# Patient Record
Sex: FEMALE | Race: WHITE | NOT HISPANIC OR LATINO
[De-identification: names, ages, dates, MRNs, and addresses within clinical notes are randomized per-mention and may not be internally consistent; named-entity substitution may affect disease eponyms.]

---

## 2017-04-20 ENCOUNTER — ASOB RESULT (OUTPATIENT)
Age: 32
End: 2017-04-20

## 2017-04-20 ENCOUNTER — APPOINTMENT (OUTPATIENT)
Dept: ANTEPARTUM | Facility: CLINIC | Age: 32
End: 2017-04-20

## 2017-05-10 ENCOUNTER — APPOINTMENT (OUTPATIENT)
Dept: ANTEPARTUM | Facility: CLINIC | Age: 32
End: 2017-05-10

## 2017-05-10 ENCOUNTER — ASOB RESULT (OUTPATIENT)
Age: 32
End: 2017-05-10

## 2017-09-23 ENCOUNTER — OUTPATIENT (OUTPATIENT)
Dept: OUTPATIENT SERVICES | Facility: HOSPITAL | Age: 32
LOS: 1 days | End: 2017-09-23
Payer: MEDICAID

## 2017-09-23 DIAGNOSIS — Z3A.00 WEEKS OF GESTATION OF PREGNANCY NOT SPECIFIED: ICD-10-CM

## 2017-09-23 DIAGNOSIS — O26.899 OTHER SPECIFIED PREGNANCY RELATED CONDITIONS, UNSPECIFIED TRIMESTER: ICD-10-CM

## 2017-09-23 PROCEDURE — 59025 FETAL NON-STRESS TEST: CPT

## 2017-09-23 PROCEDURE — G0463: CPT

## 2017-09-23 PROCEDURE — 59025 FETAL NON-STRESS TEST: CPT | Mod: 26

## 2017-10-02 ENCOUNTER — OUTPATIENT (OUTPATIENT)
Dept: OUTPATIENT SERVICES | Facility: HOSPITAL | Age: 32
LOS: 1 days | End: 2017-10-02
Payer: MEDICAID

## 2017-10-02 ENCOUNTER — APPOINTMENT (OUTPATIENT)
Dept: ANTEPARTUM | Facility: CLINIC | Age: 32
End: 2017-10-02
Payer: MEDICAID

## 2017-10-02 ENCOUNTER — ASOB RESULT (OUTPATIENT)
Age: 32
End: 2017-10-02

## 2017-10-02 DIAGNOSIS — Z01.818 ENCOUNTER FOR OTHER PREPROCEDURAL EXAMINATION: ICD-10-CM

## 2017-10-02 PROCEDURE — 76816 OB US FOLLOW-UP PER FETUS: CPT

## 2017-10-02 PROCEDURE — 76818 FETAL BIOPHYS PROFILE W/NST: CPT

## 2017-10-02 PROCEDURE — 76818 FETAL BIOPHYS PROFILE W/NST: CPT | Mod: 26

## 2017-10-02 PROCEDURE — 76816 OB US FOLLOW-UP PER FETUS: CPT | Mod: 26

## 2017-10-03 DIAGNOSIS — O35.8XX0 MATERNAL CARE FOR OTHER (SUSPECTED) FETAL ABNORMALITY AND DAMAGE, NOT APPLICABLE OR UNSPECIFIED: ICD-10-CM

## 2017-10-03 DIAGNOSIS — Z03.74 ENCOUNTER FOR SUSPECTED PROBLEM WITH FETAL GROWTH RULED OUT: ICD-10-CM

## 2017-10-03 DIAGNOSIS — O48.0 POST-TERM PREGNANCY: ICD-10-CM

## 2017-10-04 ENCOUNTER — APPOINTMENT (OUTPATIENT)
Dept: ANTEPARTUM | Facility: CLINIC | Age: 32
End: 2017-10-04
Payer: MEDICAID

## 2017-10-04 ENCOUNTER — ASOB RESULT (OUTPATIENT)
Age: 32
End: 2017-10-04

## 2017-10-04 ENCOUNTER — OUTPATIENT (OUTPATIENT)
Dept: OUTPATIENT SERVICES | Facility: HOSPITAL | Age: 32
LOS: 1 days | End: 2017-10-04
Payer: MEDICAID

## 2017-10-04 DIAGNOSIS — Z01.818 ENCOUNTER FOR OTHER PREPROCEDURAL EXAMINATION: ICD-10-CM

## 2017-10-04 PROCEDURE — 76818 FETAL BIOPHYS PROFILE W/NST: CPT | Mod: 26

## 2017-10-04 PROCEDURE — 76818 FETAL BIOPHYS PROFILE W/NST: CPT

## 2017-10-05 DIAGNOSIS — O48.0 POST-TERM PREGNANCY: ICD-10-CM

## 2017-10-09 ENCOUNTER — INPATIENT (INPATIENT)
Facility: HOSPITAL | Age: 32
LOS: 2 days | Discharge: HOME | End: 2017-10-12
Attending: OBSTETRICS & GYNECOLOGY | Admitting: OBSTETRICS & GYNECOLOGY
Payer: MEDICAID

## 2017-10-09 ENCOUNTER — APPOINTMENT (OUTPATIENT)
Dept: ANTEPARTUM | Facility: CLINIC | Age: 32
End: 2017-10-09

## 2017-10-09 ENCOUNTER — TRANSCRIPTION ENCOUNTER (OUTPATIENT)
Age: 32
End: 2017-10-09

## 2017-10-09 VITALS — WEIGHT: 143.3 LBS | HEIGHT: 62 IN

## 2017-10-09 DIAGNOSIS — O26.899 OTHER SPECIFIED PREGNANCY RELATED CONDITIONS, UNSPECIFIED TRIMESTER: ICD-10-CM

## 2017-10-09 DIAGNOSIS — Z3A.00 WEEKS OF GESTATION OF PREGNANCY NOT SPECIFIED: ICD-10-CM

## 2017-10-09 DIAGNOSIS — Z34.80 ENCOUNTER FOR SUPERVISION OF OTHER NORMAL PREGNANCY, UNSPECIFIED TRIMESTER: ICD-10-CM

## 2017-10-09 LAB
BASOPHILS # BLD AUTO: 0 K/UL — SIGNIFICANT CHANGE UP (ref 0–0.2)
BASOPHILS NFR BLD AUTO: 0 % — SIGNIFICANT CHANGE UP (ref 0–2)
BLD GP AB SCN SERPL QL: NEGATIVE — SIGNIFICANT CHANGE UP
EOSINOPHIL # BLD AUTO: 0.1 K/UL — SIGNIFICANT CHANGE UP (ref 0–0.5)
EOSINOPHIL NFR BLD AUTO: 0.8 % — SIGNIFICANT CHANGE UP (ref 0–6)
HCT VFR BLD CALC: 34.6 % — SIGNIFICANT CHANGE UP (ref 34.5–45)
HGB BLD-MCNC: 12.4 G/DL — SIGNIFICANT CHANGE UP (ref 11.5–15.5)
LYMPHOCYTES # BLD AUTO: 1.9 K/UL — SIGNIFICANT CHANGE UP (ref 1–3.3)
LYMPHOCYTES # BLD AUTO: 18.9 % — SIGNIFICANT CHANGE UP (ref 13–44)
MCHC RBC-ENTMCNC: 33.7 PG — SIGNIFICANT CHANGE UP (ref 27–34)
MCHC RBC-ENTMCNC: 35.9 GM/DL — SIGNIFICANT CHANGE UP (ref 32–36)
MCV RBC AUTO: 93.8 FL — SIGNIFICANT CHANGE UP (ref 80–100)
MONOCYTES # BLD AUTO: 0.6 K/UL — SIGNIFICANT CHANGE UP (ref 0–0.9)
MONOCYTES NFR BLD AUTO: 5.6 % — SIGNIFICANT CHANGE UP (ref 2–14)
NEUTROPHILS # BLD AUTO: 7.4 K/UL — SIGNIFICANT CHANGE UP (ref 1.8–7.4)
NEUTROPHILS NFR BLD AUTO: 74.7 % — SIGNIFICANT CHANGE UP (ref 43–77)
PLATELET # BLD AUTO: 201 K/UL — SIGNIFICANT CHANGE UP (ref 150–400)
RBC # BLD: 3.69 M/UL — LOW (ref 3.8–5.2)
RBC # FLD: 12.1 % — SIGNIFICANT CHANGE UP (ref 10.3–14.5)
RH IG SCN BLD-IMP: POSITIVE — SIGNIFICANT CHANGE UP
T PALLIDUM AB TITR SER: NEGATIVE — SIGNIFICANT CHANGE UP
WBC # BLD: 10 K/UL — SIGNIFICANT CHANGE UP (ref 3.8–10.5)
WBC # FLD AUTO: 10 K/UL — SIGNIFICANT CHANGE UP (ref 3.8–10.5)

## 2017-10-09 RX ORDER — KETOROLAC TROMETHAMINE 30 MG/ML
30 SYRINGE (ML) INJECTION EVERY 6 HOURS
Qty: 0 | Refills: 0 | Status: DISCONTINUED | OUTPATIENT
Start: 2017-10-10 | End: 2017-10-12

## 2017-10-09 RX ORDER — ONDANSETRON 8 MG/1
4 TABLET, FILM COATED ORAL EVERY 6 HOURS
Qty: 0 | Refills: 0 | Status: DISCONTINUED | OUTPATIENT
Start: 2017-10-09 | End: 2017-10-11

## 2017-10-09 RX ORDER — HEPARIN SODIUM 5000 [USP'U]/ML
5000 INJECTION INTRAVENOUS; SUBCUTANEOUS EVERY 12 HOURS
Qty: 0 | Refills: 0 | Status: DISCONTINUED | OUTPATIENT
Start: 2017-10-10 | End: 2017-10-12

## 2017-10-09 RX ORDER — CITRIC ACID/SODIUM CITRATE 300-500 MG
15 SOLUTION, ORAL ORAL EVERY 4 HOURS
Qty: 0 | Refills: 0 | Status: DISCONTINUED | OUTPATIENT
Start: 2017-10-09 | End: 2017-10-09

## 2017-10-09 RX ORDER — SODIUM CHLORIDE 9 MG/ML
1000 INJECTION, SOLUTION INTRAVENOUS
Qty: 0 | Refills: 0 | Status: DISCONTINUED | OUTPATIENT
Start: 2017-10-09 | End: 2017-10-10

## 2017-10-09 RX ORDER — OXYTOCIN 10 UNIT/ML
333.33 VIAL (ML) INJECTION
Qty: 20 | Refills: 0 | Status: COMPLETED | OUTPATIENT
Start: 2017-10-09 | End: 2017-10-09

## 2017-10-09 RX ORDER — HYDROMORPHONE HYDROCHLORIDE 2 MG/ML
0.5 INJECTION INTRAMUSCULAR; INTRAVENOUS; SUBCUTANEOUS
Qty: 0 | Refills: 0 | Status: DISCONTINUED | OUTPATIENT
Start: 2017-10-09 | End: 2017-10-11

## 2017-10-09 RX ORDER — OXYCODONE HYDROCHLORIDE 5 MG/1
5 TABLET ORAL
Qty: 0 | Refills: 0 | Status: COMPLETED | OUTPATIENT
Start: 2017-10-10 | End: 2017-10-17

## 2017-10-09 RX ORDER — SODIUM CHLORIDE 9 MG/ML
500 INJECTION, SOLUTION INTRAVENOUS ONCE
Qty: 0 | Refills: 0 | Status: DISCONTINUED | OUTPATIENT
Start: 2017-10-09 | End: 2017-10-09

## 2017-10-09 RX ORDER — SODIUM CHLORIDE 9 MG/ML
1000 INJECTION, SOLUTION INTRAVENOUS
Qty: 0 | Refills: 0 | Status: DISCONTINUED | OUTPATIENT
Start: 2017-10-10 | End: 2017-10-12

## 2017-10-09 RX ORDER — OXYTOCIN 10 UNIT/ML
41.67 VIAL (ML) INJECTION
Qty: 20 | Refills: 0 | Status: DISCONTINUED | OUTPATIENT
Start: 2017-10-10 | End: 2017-10-12

## 2017-10-09 RX ORDER — LANOLIN
1 OINTMENT (GRAM) TOPICAL
Qty: 0 | Refills: 0 | Status: DISCONTINUED | OUTPATIENT
Start: 2017-10-10 | End: 2017-10-12

## 2017-10-09 RX ORDER — OXYTOCIN 10 UNIT/ML
333.33 VIAL (ML) INJECTION
Qty: 20 | Refills: 0 | Status: DISCONTINUED | OUTPATIENT
Start: 2017-10-09 | End: 2017-10-12

## 2017-10-09 RX ORDER — OXYTOCIN 10 UNIT/ML
41.67 VIAL (ML) INJECTION
Qty: 20 | Refills: 0 | Status: DISCONTINUED | OUTPATIENT
Start: 2017-10-09 | End: 2017-10-12

## 2017-10-09 RX ORDER — IBUPROFEN 200 MG
600 TABLET ORAL EVERY 6 HOURS
Qty: 0 | Refills: 0 | Status: COMPLETED | OUTPATIENT
Start: 2017-10-10 | End: 2018-09-08

## 2017-10-09 RX ORDER — TETANUS TOXOID, REDUCED DIPHTHERIA TOXOID AND ACELLULAR PERTUSSIS VACCINE, ADSORBED 5; 2.5; 8; 8; 2.5 [IU]/.5ML; [IU]/.5ML; UG/.5ML; UG/.5ML; UG/.5ML
0.5 SUSPENSION INTRAMUSCULAR ONCE
Qty: 0 | Refills: 0 | Status: DISCONTINUED | OUTPATIENT
Start: 2017-10-10 | End: 2017-10-12

## 2017-10-09 RX ORDER — GLYCERIN ADULT
1 SUPPOSITORY, RECTAL RECTAL AT BEDTIME
Qty: 0 | Refills: 0 | Status: DISCONTINUED | OUTPATIENT
Start: 2017-10-10 | End: 2017-10-12

## 2017-10-09 RX ORDER — NALOXONE HYDROCHLORIDE 4 MG/.1ML
0.1 SPRAY NASAL
Qty: 0 | Refills: 0 | Status: DISCONTINUED | OUTPATIENT
Start: 2017-10-09 | End: 2017-10-11

## 2017-10-09 RX ORDER — OXYCODONE HYDROCHLORIDE 5 MG/1
5 TABLET ORAL EVERY 4 HOURS
Qty: 0 | Refills: 0 | Status: DISCONTINUED | OUTPATIENT
Start: 2017-10-10 | End: 2017-10-12

## 2017-10-09 RX ORDER — CEFAZOLIN SODIUM 1 G
VIAL (EA) INJECTION
Qty: 0 | Refills: 0 | Status: DISCONTINUED | OUTPATIENT
Start: 2017-10-10 | End: 2017-10-11

## 2017-10-09 RX ORDER — FERROUS SULFATE 325(65) MG
325 TABLET ORAL DAILY
Qty: 0 | Refills: 0 | Status: DISCONTINUED | OUTPATIENT
Start: 2017-10-10 | End: 2017-10-12

## 2017-10-09 RX ORDER — HYDROMORPHONE HYDROCHLORIDE 2 MG/ML
30 INJECTION INTRAMUSCULAR; INTRAVENOUS; SUBCUTANEOUS
Qty: 0 | Refills: 0 | Status: DISCONTINUED | OUTPATIENT
Start: 2017-10-09 | End: 2017-10-11

## 2017-10-09 RX ORDER — OXYTOCIN 10 UNIT/ML
4 VIAL (ML) INJECTION
Qty: 30 | Refills: 0 | Status: DISCONTINUED | OUTPATIENT
Start: 2017-10-09 | End: 2017-10-12

## 2017-10-09 RX ORDER — SODIUM CHLORIDE 9 MG/ML
1000 INJECTION, SOLUTION INTRAVENOUS
Qty: 0 | Refills: 0 | Status: DISCONTINUED | OUTPATIENT
Start: 2017-10-09 | End: 2017-10-09

## 2017-10-09 RX ORDER — DIPHENHYDRAMINE HCL 50 MG
25 CAPSULE ORAL EVERY 6 HOURS
Qty: 0 | Refills: 0 | Status: DISCONTINUED | OUTPATIENT
Start: 2017-10-10 | End: 2017-10-12

## 2017-10-09 RX ORDER — DOCUSATE SODIUM 100 MG
100 CAPSULE ORAL
Qty: 0 | Refills: 0 | Status: DISCONTINUED | OUTPATIENT
Start: 2017-10-10 | End: 2017-10-12

## 2017-10-09 RX ORDER — OXYTOCIN 10 UNIT/ML
333.33 VIAL (ML) INJECTION
Qty: 20 | Refills: 0 | Status: COMPLETED | OUTPATIENT
Start: 2017-10-09

## 2017-10-09 RX ORDER — SIMETHICONE 80 MG/1
80 TABLET, CHEWABLE ORAL EVERY 4 HOURS
Qty: 0 | Refills: 0 | Status: DISCONTINUED | OUTPATIENT
Start: 2017-10-10 | End: 2017-10-12

## 2017-10-09 RX ORDER — ACETAMINOPHEN 500 MG
975 TABLET ORAL EVERY 6 HOURS
Qty: 0 | Refills: 0 | Status: DISCONTINUED | OUTPATIENT
Start: 2017-10-10 | End: 2017-10-12

## 2017-10-09 RX ADMIN — SODIUM CHLORIDE 125 MILLILITER(S): 9 INJECTION, SOLUTION INTRAVENOUS at 13:01

## 2017-10-09 RX ADMIN — Medication 100 MILLIGRAM(S): at 22:45

## 2017-10-09 RX ADMIN — Medication 1000 MILLIUNIT(S)/MIN: at 22:56

## 2017-10-09 RX ADMIN — SODIUM CHLORIDE 125 MILLILITER(S): 9 INJECTION, SOLUTION INTRAVENOUS at 20:35

## 2017-10-10 LAB
BASOPHILS # BLD AUTO: 0.01 K/UL — SIGNIFICANT CHANGE UP (ref 0–0.2)
BASOPHILS NFR BLD AUTO: 0.1 % — SIGNIFICANT CHANGE UP (ref 0–2)
EOSINOPHIL # BLD AUTO: 0.01 K/UL — SIGNIFICANT CHANGE UP (ref 0–0.5)
EOSINOPHIL NFR BLD AUTO: 0.1 % — SIGNIFICANT CHANGE UP (ref 0–6)
HCT VFR BLD CALC: 32.1 % — LOW (ref 34.5–45)
HGB BLD-MCNC: 11.2 G/DL — LOW (ref 11.5–15.5)
IMM GRANULOCYTES NFR BLD AUTO: 0.4 % — SIGNIFICANT CHANGE UP (ref 0–1.5)
LYMPHOCYTES # BLD AUTO: 1.89 K/UL — SIGNIFICANT CHANGE UP (ref 1–3.3)
LYMPHOCYTES # BLD AUTO: 9.5 % — LOW (ref 13–44)
MCHC RBC-ENTMCNC: 31.5 PG — SIGNIFICANT CHANGE UP (ref 27–34)
MCHC RBC-ENTMCNC: 34.9 GM/DL — SIGNIFICANT CHANGE UP (ref 32–36)
MCV RBC AUTO: 90.2 FL — SIGNIFICANT CHANGE UP (ref 80–100)
MONOCYTES # BLD AUTO: 0.76 K/UL — SIGNIFICANT CHANGE UP (ref 0–0.9)
MONOCYTES NFR BLD AUTO: 3.8 % — SIGNIFICANT CHANGE UP (ref 2–14)
NEUTROPHILS # BLD AUTO: 17.18 K/UL — HIGH (ref 1.8–7.4)
NEUTROPHILS NFR BLD AUTO: 86.1 % — HIGH (ref 43–77)
PLATELET # BLD AUTO: 227 K/UL — SIGNIFICANT CHANGE UP (ref 150–400)
RBC # BLD: 3.56 M/UL — LOW (ref 3.8–5.2)
RBC # FLD: 13 % — SIGNIFICANT CHANGE UP (ref 10.3–14.5)
WBC # BLD: 19.93 K/UL — HIGH (ref 3.8–10.5)
WBC # FLD AUTO: 19.93 K/UL — HIGH (ref 3.8–10.5)

## 2017-10-10 PROCEDURE — 59514 CESAREAN DELIVERY ONLY: CPT | Mod: AS,U9

## 2017-10-10 RX ORDER — CEFAZOLIN SODIUM 1 G
2000 VIAL (EA) INJECTION EVERY 8 HOURS
Qty: 0 | Refills: 0 | Status: DISCONTINUED | OUTPATIENT
Start: 2017-10-10 | End: 2017-10-11

## 2017-10-10 RX ORDER — FAMOTIDINE 10 MG/ML
20 INJECTION INTRAVENOUS DAILY
Qty: 0 | Refills: 0 | Status: DISCONTINUED | OUTPATIENT
Start: 2017-10-10 | End: 2017-10-12

## 2017-10-10 RX ORDER — CEFAZOLIN SODIUM 1 G
2000 VIAL (EA) INJECTION ONCE
Qty: 0 | Refills: 0 | Status: COMPLETED | OUTPATIENT
Start: 2017-10-10 | End: 2017-10-09

## 2017-10-10 RX ORDER — FAMOTIDINE 10 MG/ML
20 INJECTION INTRAVENOUS ONCE
Qty: 0 | Refills: 0 | Status: DISCONTINUED | OUTPATIENT
Start: 2017-10-10 | End: 2017-10-10

## 2017-10-10 RX ORDER — ACETAMINOPHEN 500 MG
1000 TABLET ORAL ONCE
Qty: 0 | Refills: 0 | Status: COMPLETED | OUTPATIENT
Start: 2017-10-10 | End: 2017-10-10

## 2017-10-10 RX ADMIN — HYDROMORPHONE HYDROCHLORIDE 0.5 MILLIGRAM(S): 2 INJECTION INTRAMUSCULAR; INTRAVENOUS; SUBCUTANEOUS at 02:03

## 2017-10-10 RX ADMIN — Medication 25 MILLIGRAM(S): at 05:19

## 2017-10-10 RX ADMIN — Medication 1000 MILLIGRAM(S): at 00:33

## 2017-10-10 RX ADMIN — Medication 30 MILLIGRAM(S): at 21:30

## 2017-10-10 RX ADMIN — Medication 30 MILLIGRAM(S): at 20:57

## 2017-10-10 RX ADMIN — Medication 30 MILLIGRAM(S): at 09:30

## 2017-10-10 RX ADMIN — HYDROMORPHONE HYDROCHLORIDE 30 MILLILITER(S): 2 INJECTION INTRAMUSCULAR; INTRAVENOUS; SUBCUTANEOUS at 03:51

## 2017-10-10 RX ADMIN — Medication 100 MILLIGRAM(S): at 14:03

## 2017-10-10 RX ADMIN — Medication 30 MILLIGRAM(S): at 08:56

## 2017-10-10 RX ADMIN — HEPARIN SODIUM 5000 UNIT(S): 5000 INJECTION INTRAVENOUS; SUBCUTANEOUS at 06:37

## 2017-10-10 RX ADMIN — Medication 100 MILLIGRAM(S): at 22:15

## 2017-10-10 RX ADMIN — Medication 30 MILLIGRAM(S): at 15:09

## 2017-10-10 RX ADMIN — SIMETHICONE 80 MILLIGRAM(S): 80 TABLET, CHEWABLE ORAL at 18:12

## 2017-10-10 RX ADMIN — Medication 100 MILLIGRAM(S): at 06:37

## 2017-10-10 RX ADMIN — Medication 30 MILLIGRAM(S): at 15:30

## 2017-10-10 RX ADMIN — FAMOTIDINE 20 MILLIGRAM(S): 10 INJECTION INTRAVENOUS at 15:09

## 2017-10-10 RX ADMIN — HEPARIN SODIUM 5000 UNIT(S): 5000 INJECTION INTRAVENOUS; SUBCUTANEOUS at 17:05

## 2017-10-10 RX ADMIN — HYDROMORPHONE HYDROCHLORIDE 30 MILLILITER(S): 2 INJECTION INTRAMUSCULAR; INTRAVENOUS; SUBCUTANEOUS at 00:31

## 2017-10-10 RX ADMIN — Medication 400 MILLIGRAM(S): at 00:05

## 2017-10-10 RX ADMIN — Medication 30 MILLIGRAM(S): at 02:59

## 2017-10-10 NOTE — CHART NOTE - NSCHARTNOTEFT_GEN_A_CORE
Pt w/o complaints this morning, bandage removed, incision clean, dry and intact, steri-strips in place.

## 2017-10-10 NOTE — PROVIDER CONTACT NOTE (OTHER) - ASSESSMENT
MD hardin paged to have someone come to the bedside to evaluate patient at 2350. pt uterus firm with massage, heavy bleeding. no clots present. VS stable at this time except pulse which was fluctuating between 110-150 bpm but pt had pain 10/10 at this time. lower abdomen opsite dressing clean dry and intact. MD Prescott at bedside 2355, pt continuing to bleed as uterus is massaged. GERDA Flower and GERDA Alvarez at bedside; OB Hemmorhage kit to bedside, IV Pitocin bolusing at this time. GERDA Vogt Charge Nurse made aware and OTIS Gutierrez made aware and will come to Recovery Room at this time. MD Ross called to bedside to evaluate patient at this time due to increased bleeding. [id number] Cody at bedside to evaluate patient.

## 2017-10-10 NOTE — PROVIDER CONTACT NOTE (OTHER) - ACTION/TREATMENT ORDERED:
MD Prescott and MD Ross at bedside to evaluate patient. IV Acetaminophen to be administered for pain at this time.

## 2017-10-11 ENCOUNTER — TRANSCRIPTION ENCOUNTER (OUTPATIENT)
Age: 32
End: 2017-10-11

## 2017-10-11 RX ORDER — IBUPROFEN 200 MG
600 TABLET ORAL EVERY 6 HOURS
Qty: 0 | Refills: 0 | Status: DISCONTINUED | OUTPATIENT
Start: 2017-10-11 | End: 2017-10-12

## 2017-10-11 RX ORDER — OXYCODONE HYDROCHLORIDE 5 MG/1
5 TABLET ORAL
Qty: 0 | Refills: 0 | Status: DISCONTINUED | OUTPATIENT
Start: 2017-10-11 | End: 2017-10-12

## 2017-10-11 RX ADMIN — Medication 100 MILLIGRAM(S): at 05:38

## 2017-10-11 RX ADMIN — OXYCODONE HYDROCHLORIDE 5 MILLIGRAM(S): 5 TABLET ORAL at 15:46

## 2017-10-11 RX ADMIN — OXYCODONE HYDROCHLORIDE 5 MILLIGRAM(S): 5 TABLET ORAL at 16:30

## 2017-10-11 RX ADMIN — Medication 100 MILLIGRAM(S): at 22:29

## 2017-10-11 RX ADMIN — Medication 30 MILLIGRAM(S): at 03:50

## 2017-10-11 RX ADMIN — Medication 30 MILLIGRAM(S): at 03:20

## 2017-10-11 RX ADMIN — OXYCODONE HYDROCHLORIDE 5 MILLIGRAM(S): 5 TABLET ORAL at 19:00

## 2017-10-11 RX ADMIN — Medication 600 MILLIGRAM(S): at 22:29

## 2017-10-11 RX ADMIN — HEPARIN SODIUM 5000 UNIT(S): 5000 INJECTION INTRAVENOUS; SUBCUTANEOUS at 05:37

## 2017-10-11 RX ADMIN — Medication 600 MILLIGRAM(S): at 23:00

## 2017-10-11 RX ADMIN — OXYCODONE HYDROCHLORIDE 5 MILLIGRAM(S): 5 TABLET ORAL at 23:29

## 2017-10-11 RX ADMIN — Medication 600 MILLIGRAM(S): at 13:08

## 2017-10-11 RX ADMIN — OXYCODONE HYDROCHLORIDE 5 MILLIGRAM(S): 5 TABLET ORAL at 18:26

## 2017-10-11 RX ADMIN — Medication 600 MILLIGRAM(S): at 12:22

## 2017-10-11 RX ADMIN — SIMETHICONE 80 MILLIGRAM(S): 80 TABLET, CHEWABLE ORAL at 18:27

## 2017-10-11 RX ADMIN — Medication 1 TABLET(S): at 12:22

## 2017-10-11 RX ADMIN — HEPARIN SODIUM 5000 UNIT(S): 5000 INJECTION INTRAVENOUS; SUBCUTANEOUS at 18:27

## 2017-10-11 RX ADMIN — Medication 325 MILLIGRAM(S): at 12:22

## 2017-10-11 NOTE — DISCHARGE NOTE OB - CARE PROVIDER_API CALL
Celina Hitchcock), Obstetrics and Gynecology  [address]  [address]  [place] [address]  Phone: [phone]  Fax: [phone]

## 2017-10-12 VITALS
HEART RATE: 73 BPM | OXYGEN SATURATION: 100 % | TEMPERATURE: 98 F | DIASTOLIC BLOOD PRESSURE: 70 MMHG | RESPIRATION RATE: 18 BRPM | SYSTOLIC BLOOD PRESSURE: 108 MMHG

## 2017-10-12 LAB
BASOPHILS # BLD AUTO: 0 K/UL — SIGNIFICANT CHANGE UP (ref 0–0.2)
BASOPHILS NFR BLD AUTO: 0 % — SIGNIFICANT CHANGE UP (ref 0–2)
EOSINOPHIL # BLD AUTO: 0.11 K/UL — SIGNIFICANT CHANGE UP (ref 0–0.5)
EOSINOPHIL NFR BLD AUTO: 1.1 % — SIGNIFICANT CHANGE UP (ref 0–6)
HCT VFR BLD CALC: 30 % — LOW (ref 34.5–45)
HGB BLD-MCNC: 9.9 G/DL — LOW (ref 11.5–15.5)
IMM GRANULOCYTES NFR BLD AUTO: 0.2 % — SIGNIFICANT CHANGE UP (ref 0–1.5)
LYMPHOCYTES # BLD AUTO: 1.91 K/UL — SIGNIFICANT CHANGE UP (ref 1–3.3)
LYMPHOCYTES # BLD AUTO: 19.5 % — SIGNIFICANT CHANGE UP (ref 13–44)
MCHC RBC-ENTMCNC: 30.5 PG — SIGNIFICANT CHANGE UP (ref 27–34)
MCHC RBC-ENTMCNC: 33 GM/DL — SIGNIFICANT CHANGE UP (ref 32–36)
MCV RBC AUTO: 92.3 FL — SIGNIFICANT CHANGE UP (ref 80–100)
MONOCYTES # BLD AUTO: 0.52 K/UL — SIGNIFICANT CHANGE UP (ref 0–0.9)
MONOCYTES NFR BLD AUTO: 5.3 % — SIGNIFICANT CHANGE UP (ref 2–14)
NEUTROPHILS # BLD AUTO: 7.21 K/UL — SIGNIFICANT CHANGE UP (ref 1.8–7.4)
NEUTROPHILS NFR BLD AUTO: 73.9 % — SIGNIFICANT CHANGE UP (ref 43–77)
PLATELET # BLD AUTO: 261 K/UL — SIGNIFICANT CHANGE UP (ref 150–400)
RBC # BLD: 3.25 M/UL — LOW (ref 3.8–5.2)
RBC # FLD: 13.5 % — SIGNIFICANT CHANGE UP (ref 10.3–14.5)
WBC # BLD: 9.77 K/UL — SIGNIFICANT CHANGE UP (ref 3.8–10.5)
WBC # FLD AUTO: 9.77 K/UL — SIGNIFICANT CHANGE UP (ref 3.8–10.5)

## 2017-10-12 PROCEDURE — 59050 FETAL MONITOR W/REPORT: CPT

## 2017-10-12 PROCEDURE — 86850 RBC ANTIBODY SCREEN: CPT

## 2017-10-12 PROCEDURE — 85027 COMPLETE CBC AUTOMATED: CPT

## 2017-10-12 PROCEDURE — 86900 BLOOD TYPING SEROLOGIC ABO: CPT

## 2017-10-12 PROCEDURE — 86780 TREPONEMA PALLIDUM: CPT

## 2017-10-12 PROCEDURE — 86901 BLOOD TYPING SEROLOGIC RH(D): CPT

## 2017-10-12 PROCEDURE — 59025 FETAL NON-STRESS TEST: CPT

## 2017-10-12 PROCEDURE — G0463: CPT

## 2017-10-12 RX ADMIN — Medication 1 TABLET(S): at 11:27

## 2017-10-12 RX ADMIN — Medication 325 MILLIGRAM(S): at 11:27

## 2017-10-12 RX ADMIN — Medication 600 MILLIGRAM(S): at 14:10

## 2017-10-12 RX ADMIN — Medication 975 MILLIGRAM(S): at 11:27

## 2017-10-12 RX ADMIN — Medication 975 MILLIGRAM(S): at 17:13

## 2017-10-12 RX ADMIN — Medication 600 MILLIGRAM(S): at 13:13

## 2017-10-12 RX ADMIN — Medication 600 MILLIGRAM(S): at 06:57

## 2017-10-12 RX ADMIN — Medication 600 MILLIGRAM(S): at 06:35

## 2017-10-12 RX ADMIN — Medication 975 MILLIGRAM(S): at 18:00

## 2017-10-12 RX ADMIN — Medication 975 MILLIGRAM(S): at 12:15

## 2017-10-12 RX ADMIN — SIMETHICONE 80 MILLIGRAM(S): 80 TABLET, CHEWABLE ORAL at 09:01

## 2017-10-12 RX ADMIN — OXYCODONE HYDROCHLORIDE 5 MILLIGRAM(S): 5 TABLET ORAL at 00:00

## 2017-10-12 RX ADMIN — Medication 600 MILLIGRAM(S): at 19:41

## 2017-10-12 RX ADMIN — HEPARIN SODIUM 5000 UNIT(S): 5000 INJECTION INTRAVENOUS; SUBCUTANEOUS at 06:33

## 2017-10-12 NOTE — PROGRESS NOTE ADULT - SUBJECTIVE AND OBJECTIVE BOX
Day 1\0 of Anesthesia Pain Management Service    SUBJECTIVE: Patient is doing well with IV PCA    Pain Scale Score:	[X] Refer to charted pain scores    THERAPY:    [ ] IV PCA Morphine		[ ] 5 mg/mL	[ ] 1 mg/mL  [X] IV PCA Hydromorphone	[ ] 5 mg/mL	[X] 1 mg/mL  [ ] IV PCA Fentanyl		[ ] 50 micrograms/mL    Demand dose: 0.2 mg     Lockout: 6 minutes   Continuous Rate: 0 mg/hr  4 Hour Limit: 4 mg    MEDICATIONS  (STANDING):  acetaminophen   Tablet. 975 milliGRAM(s) Oral every 6 hours  ceFAZolin   IVPB      ceFAZolin   IVPB 2000 milliGRAM(s) IV Intermittent every 8 hours  diphtheria/tetanus/pertussis (acellular) Vaccine (ADAcel) 0.5 milliLiter(s) IntraMuscular once  ferrous    sulfate 325 milliGRAM(s) Oral daily  heparin  Injectable 5000 Unit(s) SubCutaneous every 12 hours  HYDROmorphone PCA (1 mG/mL) 30 milliLiter(s) PCA Continuous PCA Continuous  ibuprofen  Tablet 600 milliGRAM(s) Oral every 6 hours  ketorolac   Injectable 30 milliGRAM(s) IV Push every 6 hours  lactated ringers. 1000 milliLiter(s) (125 mL/Hr) IV Continuous <Continuous>  oxyCODONE    IR 5 milliGRAM(s) Oral every 3 hours  oxytocin Infusion 41.667 milliUNIT(s)/Min (125 mL/Hr) IV Continuous <Continuous>  oxytocin Infusion 333.333 milliUNIT(s)/Min (1000 mL/Hr) IV Continuous <Continuous>  oxytocin Infusion 41.667 milliUNIT(s)/Min (125 mL/Hr) IV Continuous <Continuous>  oxytocin Infusion 4 milliUNIT(s)/Min (4 mL/Hr) IV Continuous <Continuous>  prenatal multivitamin 1 Tablet(s) Oral daily    MEDICATIONS  (PRN):  diphenhydrAMINE   Capsule 25 milliGRAM(s) Oral every 6 hours PRN Itching  docusate sodium 100 milliGRAM(s) Oral two times a day PRN Stool Softening  glycerin Suppository - Adult 1 Suppository(s) Rectal at bedtime PRN Constipation  HYDROmorphone PCA (1 mG/mL) Rescue Clinician Bolus 0.5 milliGRAM(s) IV Push every 15 minutes PRN for Pain Scale GREATER THAN 6  lanolin Ointment 1 Application(s) Topical every 3 hours PRN Sore Nipples  naloxone Injectable 0.1 milliGRAM(s) IV Push every 3 minutes PRN For ANY of the following changes in patient status:  A. RR LESS THAN 10 breaths per minute, B. Oxygen saturation LESS THAN 90%, C. Sedation score of 6  ondansetron Injectable 4 milliGRAM(s) IV Push every 6 hours PRN Nausea  oxyCODONE    IR 5 milliGRAM(s) Oral every 4 hours PRN Severe Pain (7 - 10)  simethicone 80 milliGRAM(s) Chew every 4 hours PRN Gas      OBJECTIVE:    Sedation Score:	[ X] Alert	[ ] Drowsy 	[ ] Arousable	[ ] Asleep	[ ] Unresponsive    Side Effects:	[X ] None	[ ] Nausea	[ ] Vomiting	[ ] Pruritus  		[ ] Other:    Vital Signs Last 24 Hrs  T(C): 36.7 (10 Oct 2017 08:47), Max: 36.9 (09 Oct 2017 23:50)  T(F): 98 (10 Oct 2017 08:47), Max: 98.4 (09 Oct 2017 23:50)  HR: 65 (10 Oct 2017 08:47) (56 - 90)  BP: 102/66 (10 Oct 2017 08:47) (102/66 - 146/68)  BP(mean): 80 (10 Oct 2017 03:15) (74 - 98)  RR: 18 (10 Oct 2017 08:47) (12 - 27)  SpO2: 95% (10 Oct 2017 08:47) (95% - 100%)    ASSESSMENT/ PLAN    Therapy to  be:               [X] Continued   [ ] Discontinued   [ ] Changed to PRN Analgesics    Documentation and Verification of current medications:   [X] Done	[ ] Not done, not eligible    Comments:
Date:  VS:Vital Signs Last 24 Hrs  T(C): 36.8 (10 Oct 2017 04:30), Max: 36.9 (09 Oct 2017 23:50)  T(F): 98.3 (10 Oct 2017 04:30), Max: 98.4 (09 Oct 2017 23:50)  HR: 60 (10 Oct 2017 04:30) (56 - 90)  BP: 106/65 (10 Oct 2017 04:30) (106/65 - 146/68)  BP(mean): 80 (10 Oct 2017 03:15) (74 - 98)  RR: 18 (10 Oct 2017 04:30) (12 - 27)  SpO2: 96% (10 Oct 2017 03:15) (95% - 100%)    Abdomen soft, fundus firm  Incision clean, dry and intact  Lochia WNL  Voiding, stable
Date:  VS:Vital Signs Last 24 Hrs  T(C): 36.8 (11 Oct 2017 05:00), Max: 36.9 (10 Oct 2017 13:51)  T(F): 98.2 (11 Oct 2017 05:00), Max: 98.4 (10 Oct 2017 13:51)  HR: 60 (11 Oct 2017 05:00) (60 - 88)  BP: 100/65 (11 Oct 2017 05:00) (80/51 - 103/62)  BP(mean): --  RR: 18 (11 Oct 2017 05:00) (18 - 18)  SpO2: 99% (11 Oct 2017 01:00) (96% - 99%)    Abdomen soft, fundus firm  Incision clean, dry and intact  Lochia WNL  Voiding, stable
Day 2\1 of Anesthesia Pain Management Service    SUBJECTIVE: Patient is doing well with IV PCA    Pain Scale Score:	[X] Refer to charted pain scores    THERAPY:    [ ] IV PCA Morphine		[ ] 5 mg/mL	[ ] 1 mg/mL  [X] IV PCA Hydromorphone	[ ] 5 mg/mL	[X] 1 mg/mL  [ ] IV PCA Fentanyl		[ ] 50 micrograms/mL    Demand dose: 0.2 mg     Lockout: 6 minutes   Continuous Rate: 0 mg/hr  4 Hour Limit: 4 mg    MEDICATIONS  (STANDING):  acetaminophen   Tablet. 975 milliGRAM(s) Oral every 6 hours  diphtheria/tetanus/pertussis (acellular) Vaccine (ADAcel) 0.5 milliLiter(s) IntraMuscular once  famotidine    Tablet 20 milliGRAM(s) Oral daily  ferrous    sulfate 325 milliGRAM(s) Oral daily  heparin  Injectable 5000 Unit(s) SubCutaneous every 12 hours  ibuprofen  Tablet 600 milliGRAM(s) Oral every 6 hours  ketorolac   Injectable 30 milliGRAM(s) IV Push every 6 hours  lactated ringers. 1000 milliLiter(s) (125 mL/Hr) IV Continuous <Continuous>  oxyCODONE    IR 5 milliGRAM(s) Oral every 3 hours  oxytocin Infusion 41.667 milliUNIT(s)/Min (125 mL/Hr) IV Continuous <Continuous>  oxytocin Infusion 333.333 milliUNIT(s)/Min (1000 mL/Hr) IV Continuous <Continuous>  oxytocin Infusion 41.667 milliUNIT(s)/Min (125 mL/Hr) IV Continuous <Continuous>  oxytocin Infusion 4 milliUNIT(s)/Min (4 mL/Hr) IV Continuous <Continuous>  prenatal multivitamin 1 Tablet(s) Oral daily    MEDICATIONS  (PRN):  diphenhydrAMINE   Capsule 25 milliGRAM(s) Oral every 6 hours PRN Itching  docusate sodium 100 milliGRAM(s) Oral two times a day PRN Stool Softening  glycerin Suppository - Adult 1 Suppository(s) Rectal at bedtime PRN Constipation  lanolin Ointment 1 Application(s) Topical every 3 hours PRN Sore Nipples  oxyCODONE    IR 5 milliGRAM(s) Oral every 4 hours PRN Severe Pain (7 - 10)  simethicone 80 milliGRAM(s) Chew every 4 hours PRN Gas      OBJECTIVE:    Sedation Score:	[ X] Alert	[ ] Drowsy 	[ ] Arousable	[ ] Asleep	[ ] Unresponsive    Side Effects:	[X ] None	[ ] Nausea	[ ] Vomiting	[ ] Pruritus  		[ ] Other:    Vital Signs Last 24 Hrs  T(C): 36.8 (11 Oct 2017 05:00), Max: 36.9 (10 Oct 2017 13:51)  T(F): 98.2 (11 Oct 2017 05:00), Max: 98.4 (10 Oct 2017 13:51)  HR: 60 (11 Oct 2017 05:00) (60 - 88)  BP: 100/65 (11 Oct 2017 05:00) (80/51 - 103/62)  BP(mean): --  RR: 18 (11 Oct 2017 05:00) (18 - 18)  SpO2: 99% (11 Oct 2017 01:00) (96% - 99%)    ASSESSMENT/ PLAN    Therapy to  be:               [ ] Continued   [ x] Discontinued   [x ] Changed to PRN Analgesics    Documentation and Verification of current medications:   [X] Done	[ ] Not done, not eligible    Comments:
Pain Management Attending Addendum    SUBJECTIVE:    Therapy:	  [ ] IV PCA	   [ ] Epidural           [ ] s/p Spinal Opoid              [ x] Postpartum infusion	  [ ] Patient controlled regional anesthesia (PCRA)    [ ] prn Analgesics    OBJECTIVE:   [ x] No new signs     [ ] Other:    Side Effects:  [ ]x None			[ ] Other:    Assessment of Catheter Site:		[x ] Intact		[ ] Other:    ASSESSMENT/PLAN  [x ] Continue current therapy    [ ] Therapy changed to:    [ ] IV PCA       [ ] Epidural     [ ] prn Analgesics     [ ] post partum infusion    Comments:
Pain Management Attending Addendum    SUBJECTIVE: Patient doing well with IV PCA    Therapy:    [X] IV PCA         [ ] PRN Analgesics    OBJECTIVE:   [X] Pain appropriately controlled    [ ] Other:    Side Effects:  [X] None	             [ ] Nausea              [ ] Pruritis                	[ ] Other:    ASSESSMENT/PLAN: Continue current therapy    Comments:
Postpartum Note,  Section  She is a  32y woman who is now post-operative day 3    Subjective:  The patient feels well.  She is ambulating.   She is tolerating regular diet.  She denies nausea and vomiting.  She is voiding.  Her pain is controlled.  She reports normal postpartum bleeding.      Physical exam:    Vital Signs Last 24 Hrs  T(C): 37 (12 Oct 2017 05:00), Max: 37 (12 Oct 2017 05:00)  T(F): 98.6 (12 Oct 2017 05:00), Max: 98.6 (12 Oct 2017 05:00)  HR: 68 (12 Oct 2017 05:00) (68 - 91)  BP: 105/58 (12 Oct 2017 05:00) (101/66 - 110/73)  BP(mean): --  RR: 18 (12 Oct 2017 05:00) (18 - 18)  SpO2: 99% (11 Oct 2017 21:02) (99% - 99%)    Gen: NAD  Breast: Soft, nontender, not engorged.  Abdomen: Soft, nontender, no distension , firm uterine fundus at umbilicus.  Incision: Clean, dry, and intact with steri strips  Pelvic: Normal lochia noted  Ext: No calf tenderness    LABS:                        9.9    9.77  )-----------( 261      ( 12 Oct 2017 07:20 )             30.0                   Allergies    sulfa drugs (Hives)  sulfa drugs (Unknown)    Intolerances      MEDICATIONS  (STANDING):  acetaminophen   Tablet. 975 milliGRAM(s) Oral every 6 hours  diphtheria/tetanus/pertussis (acellular) Vaccine (ADAcel) 0.5 milliLiter(s) IntraMuscular once  famotidine    Tablet 20 milliGRAM(s) Oral daily  ferrous    sulfate 325 milliGRAM(s) Oral daily  heparin  Injectable 5000 Unit(s) SubCutaneous every 12 hours  ibuprofen  Tablet 600 milliGRAM(s) Oral every 6 hours  ketorolac   Injectable 30 milliGRAM(s) IV Push every 6 hours  lactated ringers. 1000 milliLiter(s) (125 mL/Hr) IV Continuous <Continuous>  oxyCODONE    IR 5 milliGRAM(s) Oral every 3 hours  oxytocin Infusion 41.667 milliUNIT(s)/Min (125 mL/Hr) IV Continuous <Continuous>  oxytocin Infusion 333.333 milliUNIT(s)/Min (1000 mL/Hr) IV Continuous <Continuous>  oxytocin Infusion 41.667 milliUNIT(s)/Min (125 mL/Hr) IV Continuous <Continuous>  oxytocin Infusion 4 milliUNIT(s)/Min (4 mL/Hr) IV Continuous <Continuous>  prenatal multivitamin 1 Tablet(s) Oral daily    MEDICATIONS  (PRN):  diphenhydrAMINE   Capsule 25 milliGRAM(s) Oral every 6 hours PRN Itching  docusate sodium 100 milliGRAM(s) Oral two times a day PRN Stool Softening  glycerin Suppository - Adult 1 Suppository(s) Rectal at bedtime PRN Constipation  lanolin Ointment 1 Application(s) Topical every 3 hours PRN Sore Nipples  oxyCODONE    IR 5 milliGRAM(s) Oral every 4 hours PRN Severe Pain (7 - 10)  simethicone 80 milliGRAM(s) Chew every 4 hours PRN Gas        Assessment and Plan  POD #3 s/p  section  Doing well.  Encourage ambulation.  Analgesia prn  Discharge home

## 2018-05-29 ENCOUNTER — RESULT REVIEW (OUTPATIENT)
Age: 33
End: 2018-05-29

## 2019-12-24 NOTE — PATIENT PROFILE OB - WEIGHT : PRESENT IN LBS
UROLOGY FOLLOW UP    Chief Complaint:  Chief Complaint   Patient presents with   â¢ Follow-up     review bx results       History of Present Illness:  Jonny Gonzales is a 66year old male who presents today for follow up status post prostate biopsy. He did undergo a repeat prostate biopsy for rising PSA following a prior negative biopsy in 2017. He denies fevers. He does report that his urinary stream caliber has weakened over the past several years and he would like to undergo treatment for this. Of note, he thus far has been unable to obtain a prostate MRI secondary to the fact that his insurance has not cover this. He would like to try to obtain a prostate MRI in the future if indicated. IPSS score today is 7. He has nocturia once or twice per night. Urinary stream is slightly weakened. Denies intermittent see or urgency of his urinary stream.    I have reviewed the patient's medications and allergies, past medical, surgical, social and family history, updating these as appropriate. See Histories section of the EMR for a display of this information. Past Medical History:   Diagnosis Date   â¢ Unspecified essential hypertension    â¢ Unspecified hypothyroidism    â¢ Varicose veins      Past Surgical History:   Procedure Laterality Date   â¢ Colonoscopy diagnostic  11/30/2010   â¢ Knee arthroscopy w/ meniscectomy Right 06/10/2010   â¢ Sinus surgery  03/17/2017    Maxillary antrostomy, ethomidectomy and sinusectomy   â¢ Varicose vein surgery Left      Current Outpatient Medications   Medication Sig   â¢ dilTIAZem (CARDIZEM CD) 240 MG 24 hr capsule Take 1 capsule by mouth daily. â¢ losartan (COZAAR) 25 MG tablet Take 1 tablet by mouth daily. â¢ levothyroxine (SYNTHROID, LEVOTHROID) 125 MCG tablet TAKE 1 TABLET BY MOUTH  DAILY   â¢ diclofenac-lidocaine 2-2 % cream Apply 1-2 grams (pumps) to the affected area 3-4 times daily.    â¢ Acetaminophen (TYLENOL PO)    â¢ albuterol 108 (90 Base) MCG/ACT inhaler Inhale 2 "puffs into the lungs every 4 hours as needed for Shortness of Breath or Wheezing. â¢ promethazine-codeine (PHENERGAN WITH CODEINE) 6.25-10 MG/5ML syrup 5-10 ml every 6-8 hrs prn cough   â¢ Ganciclovir (ZIRGAN) 0.15 % ophthalmic gel One drop/small ribbon in left eye 3 times daily   â¢ CARBOXYMethylcellulose (REFRESH PLUS) 0.5 % ophthalmic solution Place 1 drop into left eye every hour as needed (Dryness and irritation to left eye). â¢ sildenafil (REVATIO) 20 MG tablet Take 1 tablet by mouth daily as needed (1 to 5 tablets as needed 1 hour prior to sexual activity). No current facility-administered medications for this visit. Physical Examination:  GENERAL: Well-developed, well-nourished with appropriate appearance for age in no acute distress. Visit Vitals  BP (!) 138/92 (BP Location: LUE - Left upper extremity, Patient Position: Sitting, Cuff Size: Large Adult)   Pulse 86   Temp 97.5 Â°F (36.4 Â°C) (Temporal)   Ht 5' 8"" (1.727 m)   Wt 115.7 kg   BMI 38.77 kg/mÂ²         Pertinent Imaging and Labs:  Lab Results   Component Value Date    SODIUM 140 10/18/2019    POTASSIUM 4.9 10/18/2019    CHLORIDE 107 10/18/2019    CO2 27 10/18/2019    BUN 24 (H) 10/18/2019    CREATININE 1.33 (H) 10/18/2019    GLUCOSE 98 10/18/2019    PSA 4.56 (H) 10/18/2019       Lab Results   Component Value Date    COL YELLOW 10/18/2019    UAPP CLEAR 10/18/2019    USPG 1.015 10/18/2019    UPH 5.5 10/18/2019    UPROT NEGATIVE 10/18/2019    UGLU NEGATIVE 10/18/2019    UKET NEGATIVE 10/18/2019    UBILI NEGATIVE 10/18/2019    URBC NEGATIVE 10/18/2019    UNITR NEGATIVE 10/18/2019    UROB 0.2 10/18/2019    UWBC NEGATIVE 10/18/2019     CULTURE (no units)   Date Value   11/15/2019     NO FLUOROQUINOLONE RESISTANT GRAM NEGATIVE BACILLI ISOLATED.      Priority: Routine      Â    12/18/2019 11:46 AM - Atul, Lab In Misys     Component Collected Lab   Pathology Report 12/13/2019 Â 8:42 AM ACL   Name: Eugenia PASTOR MRN: Kory Maya [id number] Â  " /Age:1961 (Age: 66) Â  Â  Â  Jo Negro Â  Visit#: [id number] Â    Sex: Lupe Messina Pathology Report Â    Â    Client: CASANDRA Mccormick [facility] Heydi GORMAN    Submitting Physician: MD DAWIT Townsend    Â    Date [facility] #: [id number] Â    Date Specimen Received: Â 19 Radha Summers Requisition #:[id number] Â    Date Reported: Andrey Grimes Â  2019 11:46 Â  Â Location: [facility] Â  Â    Â    ______________________________________________________________________________ Â    Pathologic Diagnosis : Â    A: Prostate, right apex, needle biopsy: Â    - Negative for malignancy. Â    Â    B: Prostate, right base, needle biopsy: Â    - Negative for malignancy. Â    Â    C: Prostate, right mid, needle biopsy: Â    - Negative for malignancy. Â    Â    D: Prostate, left apex, needle biopsy: Â    - Negative for malignancy. Â    Â    E: Prostate, left base, needle biopsy: Â    - Negative for malignancy. Â    Â    F: Prostate, left mid, needle biopsy: Â    - Focal adenosis, negative for malignancy. Carole Argueta MD Â    ** Electronic Signature (MGT) 2019 11:46 ** Â    ______________________________________________________________________________ Jo Negro        PSA, Total (ng/mL)   Date Value   10/18/2019 4.56 (H)   2019 4.20 (H)   2018 3.37   2017 3.31   2016 2.54   2015 2.31   2014 1.88   2014 2.49   05/10/2013 1.60   2012 1.04     PSA Free (ng/mL)   Date Value   2019 0.71     PSA Percent Free (%)   Date Value   2019 16.98           Assessment:  Tasneem Mcdaniels is a 66year old male with a persistently rising PSA. Fortunately, his repeat prostate biopsy was negative. He does also have obstructive BPH symptoms which he would like to have treated. I did discuss with him that regarding the obstructive BPH symptoms, I recommend initially starting with medical therapy.   In the event that he does have prostate cancer which requires treatment in the future, it would be ideal to diagnosis prior to proceeding with surgical therapy as his voiding symptoms severity is not overly severe. Plan:  He will follow up in 4 months with a preclinic PSA. His PSA continues to trend upwards, I do strongly recommend that he work to obtain a prostate MRI. He is also being started on Flomax 0.4 mg q.h.s. and his symptoms will be reassessed at that time. Thanks for allowing me to participate in [address] [place] [address].      Karyn Florian MD 145

## 2021-03-14 ENCOUNTER — RESULT REVIEW (OUTPATIENT)
Age: 36
End: 2021-03-14

## 2022-03-28 ENCOUNTER — RESULT REVIEW (OUTPATIENT)
Age: 37
End: 2022-03-28

## 2022-07-18 ENCOUNTER — APPOINTMENT (OUTPATIENT)
Dept: ULTRASOUND IMAGING | Facility: CLINIC | Age: 37
End: 2022-07-18

## 2022-07-18 ENCOUNTER — OUTPATIENT (OUTPATIENT)
Dept: OUTPATIENT SERVICES | Facility: HOSPITAL | Age: 37
LOS: 1 days | End: 2022-07-18
Payer: COMMERCIAL

## 2022-07-18 DIAGNOSIS — Z00.8 ENCOUNTER FOR OTHER GENERAL EXAMINATION: ICD-10-CM

## 2022-07-18 PROCEDURE — 76830 TRANSVAGINAL US NON-OB: CPT | Mod: 26

## 2022-07-18 PROCEDURE — 76830 TRANSVAGINAL US NON-OB: CPT

## 2022-07-18 PROCEDURE — 76856 US EXAM PELVIC COMPLETE: CPT | Mod: 26

## 2022-07-18 PROCEDURE — 76856 US EXAM PELVIC COMPLETE: CPT

## 2023-02-15 NOTE — PROVIDER CONTACT NOTE (OTHER) - SITUATION
Never smoker  41.4 weeks primary c/s for brow presentation/failure to dilate alongside non-reassuing fhr tracing.

## 2023-04-25 ENCOUNTER — NON-APPOINTMENT (OUTPATIENT)
Age: 38
End: 2023-04-25

## 2023-04-25 ENCOUNTER — APPOINTMENT (OUTPATIENT)
Dept: ANTEPARTUM | Facility: CLINIC | Age: 38
End: 2023-04-25
Payer: COMMERCIAL

## 2023-04-25 ENCOUNTER — ASOB RESULT (OUTPATIENT)
Age: 38
End: 2023-04-25

## 2023-04-25 PROCEDURE — 76802 OB US < 14 WKS ADDL FETUS: CPT | Mod: 59

## 2023-04-25 PROCEDURE — 76814 OB US NUCHAL MEAS ADD-ON: CPT

## 2023-04-25 PROCEDURE — 76801 OB US < 14 WKS SINGLE FETUS: CPT | Mod: 59

## 2023-04-25 PROCEDURE — 76813 OB US NUCHAL MEAS 1 GEST: CPT

## 2023-05-23 ENCOUNTER — APPOINTMENT (OUTPATIENT)
Dept: ANTEPARTUM | Facility: CLINIC | Age: 38
End: 2023-05-23
Payer: COMMERCIAL

## 2023-05-23 ENCOUNTER — ASOB RESULT (OUTPATIENT)
Age: 38
End: 2023-05-23

## 2023-05-23 PROCEDURE — 76810 OB US >/= 14 WKS ADDL FETUS: CPT

## 2023-05-23 PROCEDURE — 99202 OFFICE O/P NEW SF 15 MIN: CPT | Mod: 25

## 2023-05-23 PROCEDURE — 76805 OB US >/= 14 WKS SNGL FETUS: CPT

## 2023-06-07 ENCOUNTER — ASOB RESULT (OUTPATIENT)
Age: 38
End: 2023-06-07

## 2023-06-07 ENCOUNTER — APPOINTMENT (OUTPATIENT)
Dept: ANTEPARTUM | Facility: CLINIC | Age: 38
End: 2023-06-07
Payer: COMMERCIAL

## 2023-06-07 PROCEDURE — 76815 OB US LIMITED FETUS(S): CPT

## 2023-06-07 PROCEDURE — 76817 TRANSVAGINAL US OBSTETRIC: CPT

## 2023-06-14 ENCOUNTER — OUTPATIENT (OUTPATIENT)
Dept: OUTPATIENT SERVICES | Facility: HOSPITAL | Age: 38
LOS: 1 days | End: 2023-06-14
Payer: COMMERCIAL

## 2023-06-14 VITALS
OXYGEN SATURATION: 100 % | DIASTOLIC BLOOD PRESSURE: 68 MMHG | RESPIRATION RATE: 18 BRPM | SYSTOLIC BLOOD PRESSURE: 114 MMHG | TEMPERATURE: 98 F | HEART RATE: 95 BPM

## 2023-06-14 VITALS — DIASTOLIC BLOOD PRESSURE: 63 MMHG | SYSTOLIC BLOOD PRESSURE: 99 MMHG | HEART RATE: 88 BPM

## 2023-06-14 DIAGNOSIS — O26.899 OTHER SPECIFIED PREGNANCY RELATED CONDITIONS, UNSPECIFIED TRIMESTER: ICD-10-CM

## 2023-06-14 PROCEDURE — 83986 ASSAY PH BODY FLUID NOS: CPT

## 2023-06-14 PROCEDURE — G0463: CPT

## 2023-06-14 NOTE — OB PROVIDER TRIAGE NOTE - NSOBPROVIDERNOTE_OBGYN_ALL_OB_FT
A&P:   Pt is a 37 y/o  at 19w3d with spontaneous mary TIUP presenting for r/o previable PPROM. No evidence of ROM. Fetal status reassuring x2.   - dc home with precautions  - pt will f/u outpatient with Dr. Hitchcock    d/w Dr. Naldo Barnett, PGY-3

## 2023-06-14 NOTE — OB PROVIDER TRIAGE NOTE - NSHPPHYSICALEXAM_GEN_ALL_CORE
VS:  Vital Signs Last 24 Hrs  T(C): 36.9 (14 Jun 2023 16:01), Max: 36.9 (14 Jun 2023 15:59)  T(F): 98.42 (14 Jun 2023 16:01), Max: 98.42 (14 Jun 2023 16:01)  HR: 88 (14 Jun 2023 17:03) (83 - 108)  BP: 99/63 (14 Jun 2023 17:03) (99/63 - 114/68)  BP(mean): --  RR: 18 (14 Jun 2023 16:01) (18 - 18)  SpO2: 97% (14 Jun 2023 16:54) (97% - 100%)    Parameters below as of 14 Jun 2023 15:59  Patient On (Oxygen Delivery Method): room air      GA: NAD  Cards: RRR  Pulm: CTAB  Abdomen: Soft, gravid, nontender    SSE: cervix closed, +physiologic discharge, - pooling, - nitrazine, - ferning    TAUS:   A: vtx, maternal R, posterior, MVP 2.96cm, +  TVUS: transverse, anterior, MVP 3.72, +    [place]: cute

## 2023-06-14 NOTE — OB RN TRIAGE NOTE - FALL HARM RISK - UNIVERSAL INTERVENTIONS
Bed in lowest position, wheels locked, appropriate side rails in place/Call bell, personal items and telephone in reach/Instruct patient to call for assistance before getting out of bed or chair/Non-slip footwear when patient is out of bed/[place] to call system/Physically safe environment - no spills, clutter or unnecessary equipment/Purposeful Proactive Rounding/Room/bathroom lighting operational, light cord in reach

## 2023-06-15 DIAGNOSIS — O34.219 MATERNAL CARE FOR UNSPECIFIED TYPE SCAR FROM PREVIOUS CESAREAN DELIVERY: ICD-10-CM

## 2023-06-15 DIAGNOSIS — F41.9 ANXIETY DISORDER, UNSPECIFIED: ICD-10-CM

## 2023-06-15 DIAGNOSIS — O99.891 OTHER SPECIFIED DISEASES AND CONDITIONS COMPLICATING PREGNANCY: ICD-10-CM

## 2023-06-15 DIAGNOSIS — O09.292 SUPERVISION OF PREGNANCY WITH OTHER POOR REPRODUCTIVE OR OBSTETRIC HISTORY, SECOND TRIMESTER: ICD-10-CM

## 2023-06-15 DIAGNOSIS — O99.342 OTHER MENTAL DISORDERS COMPLICATING PREGNANCY, SECOND TRIMESTER: ICD-10-CM

## 2023-06-15 DIAGNOSIS — Z03.71 ENCOUNTER FOR SUSPECTED PROBLEM WITH AMNIOTIC CAVITY AND MEMBRANE RULED OUT: ICD-10-CM

## 2023-06-15 DIAGNOSIS — N83.209 UNSPECIFIED OVARIAN CYST, UNSPECIFIED SIDE: ICD-10-CM

## 2023-06-15 DIAGNOSIS — O09.42 SUPERVISION OF PREGNANCY WITH GRAND MULTIPARITY, SECOND TRIMESTER: ICD-10-CM

## 2023-06-15 DIAGNOSIS — O30.042 TWIN PREGNANCY, DICHORIONIC/DIAMNIOTIC, SECOND TRIMESTER: ICD-10-CM

## 2023-06-15 DIAGNOSIS — O09.522 SUPERVISION OF ELDERLY MULTIGRAVIDA, SECOND TRIMESTER: ICD-10-CM

## 2023-06-15 DIAGNOSIS — Z3A.19 19 WEEKS GESTATION OF PREGNANCY: ICD-10-CM

## 2023-06-28 ENCOUNTER — APPOINTMENT (OUTPATIENT)
Dept: ANTEPARTUM | Facility: CLINIC | Age: 38
End: 2023-06-28
Payer: COMMERCIAL

## 2023-06-28 ENCOUNTER — ASOB RESULT (OUTPATIENT)
Age: 38
End: 2023-06-28

## 2023-06-28 PROCEDURE — 76811 OB US DETAILED SNGL FETUS: CPT

## 2023-06-28 PROCEDURE — 76812 OB US DETAILED ADDL FETUS: CPT

## 2023-07-26 ENCOUNTER — APPOINTMENT (OUTPATIENT)
Dept: ANTEPARTUM | Facility: CLINIC | Age: 38
End: 2023-07-26
Payer: COMMERCIAL

## 2023-07-26 ENCOUNTER — ASOB RESULT (OUTPATIENT)
Age: 38
End: 2023-07-26

## 2023-07-26 PROCEDURE — 76816 OB US FOLLOW-UP PER FETUS: CPT

## 2023-08-21 ENCOUNTER — ASOB RESULT (OUTPATIENT)
Age: 38
End: 2023-08-21

## 2023-08-21 ENCOUNTER — APPOINTMENT (OUTPATIENT)
Dept: ANTEPARTUM | Facility: CLINIC | Age: 38
End: 2023-08-21
Payer: COMMERCIAL

## 2023-08-21 PROCEDURE — 76818 FETAL BIOPHYS PROFILE W/NST: CPT | Mod: 59

## 2023-08-21 PROCEDURE — 76816 OB US FOLLOW-UP PER FETUS: CPT

## 2023-08-21 PROCEDURE — 76816 OB US FOLLOW-UP PER FETUS: CPT | Mod: 59

## 2023-08-30 ENCOUNTER — APPOINTMENT (OUTPATIENT)
Dept: ANTEPARTUM | Facility: CLINIC | Age: 38
End: 2023-08-30
Payer: COMMERCIAL

## 2023-08-30 ENCOUNTER — ASOB RESULT (OUTPATIENT)
Age: 38
End: 2023-08-30

## 2023-08-30 PROCEDURE — 76816 OB US FOLLOW-UP PER FETUS: CPT

## 2023-09-14 ENCOUNTER — APPOINTMENT (OUTPATIENT)
Dept: ANTEPARTUM | Facility: CLINIC | Age: 38
End: 2023-09-14

## 2023-09-27 ENCOUNTER — ASOB RESULT (OUTPATIENT)
Age: 38
End: 2023-09-27

## 2023-09-27 ENCOUNTER — APPOINTMENT (OUTPATIENT)
Dept: ANTEPARTUM | Facility: CLINIC | Age: 38
End: 2023-09-27
Payer: COMMERCIAL

## 2023-09-27 PROCEDURE — 76819 FETAL BIOPHYS PROFIL W/O NST: CPT

## 2023-09-27 PROCEDURE — 76816 OB US FOLLOW-UP PER FETUS: CPT

## 2023-09-28 ENCOUNTER — OUTPATIENT (OUTPATIENT)
Dept: OUTPATIENT SERVICES | Facility: HOSPITAL | Age: 38
LOS: 1 days | End: 2023-09-28
Payer: COMMERCIAL

## 2023-09-28 VITALS — OXYGEN SATURATION: 100 % | HEART RATE: 80 BPM

## 2023-09-28 VITALS — HEART RATE: 106 BPM | DIASTOLIC BLOOD PRESSURE: 70 MMHG | SYSTOLIC BLOOD PRESSURE: 116 MMHG

## 2023-09-28 DIAGNOSIS — O26.899 OTHER SPECIFIED PREGNANCY RELATED CONDITIONS, UNSPECIFIED TRIMESTER: ICD-10-CM

## 2023-09-28 LAB — AMNISURE ROM (RUPTURE OF MEMBRANES): NEGATIVE — SIGNIFICANT CHANGE UP

## 2023-09-28 PROCEDURE — 83986 ASSAY PH BODY FLUID NOS: CPT

## 2023-09-28 PROCEDURE — 99221 1ST HOSP IP/OBS SF/LOW 40: CPT | Mod: 25

## 2023-09-28 PROCEDURE — G0463: CPT

## 2023-09-28 PROCEDURE — 83986 ASSAY PH BODY FLUID NOS: CPT | Mod: QW

## 2023-09-28 PROCEDURE — 84112 EVAL AMNIOTIC FLUID PROTEIN: CPT

## 2023-09-28 NOTE — OB PROVIDER TRIAGE NOTE - HISTORY OF PRESENT ILLNESS
38y   @ 34 4/7wks mary TILOI presents c/o leakage of fluid after using the bathroom at 2am.  (+) fetal movements (+) cramping (has had for past few weeks). Denies vaginal bleeding. PNC uncomplicated.    EFW's Baby A 4#15, Baby B 4#10oz  GBS unknown    Ax: NKDA  MedHx: denies  Meds: PNV  ObHx:  GynHx: denies abnl paps/cysts/ fibroids/endometriosis/ HPV  SurgHx: denies  FamHx:     PE:  T(C): --  HR: 106 (23 @ 06:30) (106 - 106)  BP: 116/70 (23 @ 06:30) (116/70 - 116/70)  RR: --  SpO2: --  CV: RRR  Lungs: CTA B/L  Abd: soft/NT, gravid  EFM:  Ronan:  VE:  BSUS    A/P: 38y presents    Admit to L&D  EFM/Ronan  Routine labs  IVFluids  NPO/Bicitra  Anesthesia c/s  Anticipated   D/W 38y   @ 34 4/7wks mary SNYDER presents c/o leakage of fluid after using the bathroom at 2am.  (+) fetal movements (+) cramping (has had for past few weeks). Denies vaginal bleeding. PNC uncomplicated.    EFW's Baby A 4#15, Baby B 4#10oz  GBS unknown    Ax: Sulfa- ? as child  MedHx: anxiety- never formally Dx, no meds or therapy  Meds: PNV, ASA, Fe  ObHx:  x 4 [phone]  7#-9#1oz,   2017 pLTCS  NRFHT Male 7#  GynHx: denies abnl paps/cysts/ fibroids/endometriosis/ HPV  SurgHx: c/s x 1  FamHx: Dad- DM II  Pt accepts blood products

## 2023-09-28 NOTE — OB PROVIDER TRIAGE NOTE - NSHPPHYSICALEXAM_GEN_ALL_CORE
PE:  T(C): --  HR: 106 (09-28-23 @ 06:30) (106 - 106)  BP: 116/70 (09-28-23 @ 06:30) (116/70 - 116/70)  RR: --  SpO2: --  CV: RRR  Lungs: CTA B/L  Abd: soft/NT, gravid  EFM: tracing 1 baby, monitors to be adjusted  Faith: irritability  with occ ctx  VE: L/C/P as per Dr. Hitchcock  SSE: Neg pooling/ Neg Nitrizine/ Neg Fern

## 2023-09-28 NOTE — OB PROVIDER TRIAGE NOTE - NSOBPROVIDERNOTE_OBGYN_ALL_OB_FT
A/P: 38y   @ 34 wks mary SNYDER presents r/o ROM  EFM/[place]- monitors to be adjusted  Amnisure sent  Dr. Hitchcock aware A/P: 38y   @ 34 4/7wks mary TIUP presents r/o ROM  EFM/[place]- monitors to be adjusted  Amnisure sent  Dr. Naldo edmonds    ADDENDUM:  BSUS performed by / Stephanie. MVP Baby A 4.7, Baby B 4.2  Amnisure Negative  EFM Baby A 145/ mod marianne/ (+) accel/ (-) decel  Baby B 140/ mod marianne/ (-) accel/ (-) decel  [place] to be put back on to assess CTX pattern  For PO hydration A/P: 38y   @ 34 4/7wks mary TIUP presents r/o ROM  EFM/[place]- monitors to be adjusted  Amnisure sent  Dr. Hitchcock aware    ADDENDUM:  BSUS performed by / Stephanie. MVP Baby A 4.7, Baby B 4.2  Amnisure Negative  EFM Baby A 145/ mod marianne/ (+) accel/ (-) decel  Baby B 140/ mod marianne/ (-) accel/ (-) decel  [place] to be put back on to assess CTX pattern  For PO hydration      ADDENDUM @945AM  Pt re-examined to assess cervical change, VE unchanged  Pt to be discharged home once NST contiuous  D/W Dr. Hitchcock  Labor precautions

## 2023-09-29 DIAGNOSIS — O99.343 OTHER MENTAL DISORDERS COMPLICATING PREGNANCY, THIRD TRIMESTER: ICD-10-CM

## 2023-09-29 DIAGNOSIS — O09.43 SUPERVISION OF PREGNANCY WITH GRAND MULTIPARITY, THIRD TRIMESTER: ICD-10-CM

## 2023-09-29 DIAGNOSIS — O30.043 TWIN PREGNANCY, DICHORIONIC/DIAMNIOTIC, THIRD TRIMESTER: ICD-10-CM

## 2023-09-29 DIAGNOSIS — Z3A.34 34 WEEKS GESTATION OF PREGNANCY: ICD-10-CM

## 2023-09-29 DIAGNOSIS — O34.211 MATERNAL CARE FOR LOW TRANSVERSE SCAR FROM PREVIOUS CESAREAN DELIVERY: ICD-10-CM

## 2023-09-29 DIAGNOSIS — O09.523 SUPERVISION OF ELDERLY MULTIGRAVIDA, THIRD TRIMESTER: ICD-10-CM

## 2023-09-29 DIAGNOSIS — Z03.71 ENCOUNTER FOR SUSPECTED PROBLEM WITH AMNIOTIC CAVITY AND MEMBRANE RULED OUT: ICD-10-CM

## 2023-09-29 DIAGNOSIS — Z87.59 PERSONAL HISTORY OF OTHER COMPLICATIONS OF PREGNANCY, CHILDBIRTH AND THE PUERPERIUM: ICD-10-CM

## 2023-09-29 DIAGNOSIS — F41.9 ANXIETY DISORDER, UNSPECIFIED: ICD-10-CM

## 2023-10-09 ENCOUNTER — OUTPATIENT (OUTPATIENT)
Dept: OUTPATIENT SERVICES | Facility: HOSPITAL | Age: 38
LOS: 1 days | End: 2023-10-09
Payer: COMMERCIAL

## 2023-10-09 VITALS
HEIGHT: 62 IN | TEMPERATURE: 98 F | DIASTOLIC BLOOD PRESSURE: 66 MMHG | HEART RATE: 93 BPM | WEIGHT: 154.98 LBS | OXYGEN SATURATION: 98 % | SYSTOLIC BLOOD PRESSURE: 100 MMHG | RESPIRATION RATE: 20 BRPM

## 2023-10-09 DIAGNOSIS — O30.042 TWIN PREGNANCY, DICHORIONIC/DIAMNIOTIC, SECOND TRIMESTER: ICD-10-CM

## 2023-10-09 DIAGNOSIS — O34.211 MATERNAL CARE FOR LOW TRANSVERSE SCAR FROM PREVIOUS CESAREAN DELIVERY: ICD-10-CM

## 2023-10-09 DIAGNOSIS — Z01.818 ENCOUNTER FOR OTHER PREPROCEDURAL EXAMINATION: ICD-10-CM

## 2023-10-09 DIAGNOSIS — Z98.891 HISTORY OF UTERINE SCAR FROM PREVIOUS SURGERY: Chronic | ICD-10-CM

## 2023-10-09 PROCEDURE — 86850 RBC ANTIBODY SCREEN: CPT

## 2023-10-09 PROCEDURE — G0463: CPT

## 2023-10-09 PROCEDURE — 86901 BLOOD TYPING SEROLOGIC RH(D): CPT

## 2023-10-09 PROCEDURE — 86900 BLOOD TYPING SEROLOGIC ABO: CPT

## 2023-10-09 RX ORDER — OXYTOCIN 10 UNIT/ML
333.33 VIAL (ML) INJECTION
Qty: 20 | Refills: 0 | Status: DISCONTINUED | OUTPATIENT
Start: 2023-10-23 | End: 2023-10-23

## 2023-10-09 RX ORDER — SODIUM CHLORIDE 9 MG/ML
1000 INJECTION, SOLUTION INTRAVENOUS
Refills: 0 | Status: DISCONTINUED | OUTPATIENT
Start: 2023-10-23 | End: 2023-10-23

## 2023-10-09 RX ORDER — CITRIC ACID/SODIUM CITRATE 300-500 MG
15 SOLUTION, ORAL ORAL ONCE
Refills: 0 | Status: COMPLETED | OUTPATIENT
Start: 2023-10-23 | End: 2023-10-23

## 2023-10-09 RX ORDER — FAMOTIDINE 10 MG/ML
20 INJECTION INTRAVENOUS ONCE
Refills: 0 | Status: COMPLETED | OUTPATIENT
Start: 2023-10-23 | End: 2023-10-23

## 2023-10-09 RX ORDER — CHLORHEXIDINE GLUCONATE 213 G/1000ML
1 SOLUTION TOPICAL ONCE
Refills: 0 | Status: DISCONTINUED | OUTPATIENT
Start: 2023-10-23 | End: 2023-10-23

## 2023-10-09 RX ORDER — CEFAZOLIN SODIUM 1 G
2000 VIAL (EA) INJECTION ONCE
Refills: 0 | Status: COMPLETED | OUTPATIENT
Start: 2023-10-23 | End: 2023-10-23

## 2023-10-09 NOTE — OB PST NOTE - HISTORY OF PRESENT ILLNESS
Mrs. Fatima is a 38 year old  with PMH hx of NVSDx4,  x1, currently pregnant in third trimester with dichorionic diamniotic twins CONSTANTINE 23, scheduled for  on 10/23/23.

## 2023-10-09 NOTE — OB PST NOTE - NSANTHOSAYNRD_GEN_A_CORE
No. DONAVON screening performed.  STOP BANG Legend: 0-2 = LOW Risk; 3-4 = INTERMEDIATE Risk; 5-8 = HIGH Risk

## 2023-10-09 NOTE — OB PST NOTE - PROBLEM SELECTOR PLAN 1
Patient scheduled for  on 10/23/23.   CBC, BMP, Type and Screen Done.   Chlorhexidine wash pre-op.  Pre-op education provided.   ABO on admission.

## 2023-10-09 NOTE — OB PST NOTE - NSHPREVIEWOFSYSTEMS_GEN_ALL_CORE
Endorses occasional headaches, seen MD for them, occasional grant-cheatham, occasional nausea   Denies recent fever, chills, Visual disturbances, SOB, Chest pain, Vomitting, cramping or bleeding.   Endorses itching near previous  incision.   Denies swelling in extremities.

## 2023-10-09 NOTE — OB PST NOTE - ASSESSMENT
Dental: No loose teeth, no dentures    DASI: Able to walk moderate distances and climb stairs but feels pressure. DASI: 6.27     CAPRINI VTE 2.0 SCORE [CLOT updated 2019]    AGE RELATED RISK FACTORS                                                       MOBILITY RELATED FACTORS  [ ] Age 41-60 years                                            (1 Point)                    [ ] Bed rest                                                        (1 Point)  [ ] Age: 61-74 years                                           (2 Points)                  [ ] Plaster cast                                                   (2 Points)  [ ] Age= 75 years                                              (3 Points)                    [ ] Bed bound for more than 72 hours                 (2 Points)    DISEASE RELATED RISK FACTORS                                               GENDER SPECIFIC FACTORS  [ ] Edema in the lower extremities                       (1 Point)              [ X] Pregnancy                                                     (1 Point)  [ ] Varicose veins                                               (1 Point)                     [ ] Post-partum < 6 weeks                                   (1 Point)             [X ] BMI > 25 Kg/m2                                            (1 Point)                     [ ] Hormonal therapy  or oral contraception          (1 Point)                 [ ] Sepsis (in the previous month)                        (1 Point)               [ ] History of pregnancy complications                 (1 point)  [ ] Pneumonia or serious lung disease                                               [ ] Unexplained or recurrent                     (1 Point)           (in the previous month)                               (1 Point)  [ ] Abnormal pulmonary function test                     (1 Point)                 SURGERY RELATED RISK FACTORS  [ ] Acute myocardial infarction                              (1 Point)               [ X]  Section                                             (1 Point)  [ ] Congestive heart failure (in the previous month)  (1 Point)      [ ] Minor surgery                                                  (1 Point)   [ ] Inflammatory bowel disease                             (1 Point)               [ ] Arthroscopic surgery                                        (2 Points)  [ ] Central venous access                                      (2 Points)                [ ] General surgery lasting more than 45 minutes (2 points)  [ ] Malignancy- Present or previous                   (2 Points)                [ ] Elective arthroplasty                                         (5 points)    [ ] Stroke (in the previous month)                          (5 Points)                                                                                                                                                           HEMATOLOGY RELATED FACTORS                                                 TRAUMA RELATED RISK FACTORS  [ ] Prior episodes of VTE                                     (3 Points)                [ ] Fracture of the hip, pelvis, or leg                       (5 Points)  [ ] Positive family history for VTE                         (3 Points)             [ ] Acute spinal cord injury (in the previous month)  (5 Points)  [ ] Prothrombin 41015 A                                     (3 Points)               [ ] Paralysis  (less than 1 month)                             (5 Points)  [ ] Factor V Leiden                                             (3 Points)                  [ ] Multiple Trauma within 1 month                        (5 Points)  [ ] Lupus anticoagulants                                     (3 Points)                                                           [ ] Anticardiolipin antibodies                               (3 Points)                                                       [ ] High homocysteine in the blood                      (3 Points)                                             [ ] Other congenital or acquired thrombophilia      (3 Points)                                                [ ] Heparin induced thrombocytopenia                  (3 Points)                                     Total Score [       3   ]

## 2023-10-10 LAB
ANION GAP SERPL CALC-SCNC: 21 MMOL/L — HIGH (ref 5–17)
BUN SERPL-MCNC: 8 MG/DL — SIGNIFICANT CHANGE UP (ref 7–23)
CALCIUM SERPL-MCNC: 9.4 MG/DL — SIGNIFICANT CHANGE UP (ref 8.4–10.5)
CHLORIDE SERPL-SCNC: 98 MMOL/L — SIGNIFICANT CHANGE UP (ref 96–108)
CO2 SERPL-SCNC: 19 MMOL/L — LOW (ref 22–31)
CREAT SERPL-MCNC: 0.52 MG/DL — SIGNIFICANT CHANGE UP (ref 0.5–1.3)
EGFR: 123 ML/MIN/1.73M2 — SIGNIFICANT CHANGE UP
GLUCOSE SERPL-MCNC: <6 MG/DL — CRITICAL LOW (ref 70–99)
HCT VFR BLD CALC: 34.8 % — SIGNIFICANT CHANGE UP (ref 34.5–45)
HGB BLD-MCNC: 11 G/DL — LOW (ref 11.5–15.5)
MCHC RBC-ENTMCNC: 31.3 PG — SIGNIFICANT CHANGE UP (ref 27–34)
MCHC RBC-ENTMCNC: 31.6 GM/DL — LOW (ref 32–36)
MCV RBC AUTO: 98.9 FL — SIGNIFICANT CHANGE UP (ref 80–100)
NRBC # BLD: 0 /100 WBCS — SIGNIFICANT CHANGE UP (ref 0–0)
PLATELET # BLD AUTO: 265 K/UL — SIGNIFICANT CHANGE UP (ref 150–400)
POTASSIUM SERPL-MCNC: 4.3 MMOL/L — SIGNIFICANT CHANGE UP (ref 3.5–5.3)
POTASSIUM SERPL-SCNC: 4.3 MMOL/L — SIGNIFICANT CHANGE UP (ref 3.5–5.3)
RBC # BLD: 3.52 M/UL — LOW (ref 3.8–5.2)
RBC # FLD: 15.2 % — HIGH (ref 10.3–14.5)
SODIUM SERPL-SCNC: 138 MMOL/L — SIGNIFICANT CHANGE UP (ref 135–145)
WBC # BLD: 10.92 K/UL — HIGH (ref 3.8–10.5)
WBC # FLD AUTO: 10.92 K/UL — HIGH (ref 3.8–10.5)

## 2023-10-11 ENCOUNTER — APPOINTMENT (OUTPATIENT)
Dept: ANTEPARTUM | Facility: CLINIC | Age: 38
End: 2023-10-11
Payer: COMMERCIAL

## 2023-10-11 ENCOUNTER — ASOB RESULT (OUTPATIENT)
Age: 38
End: 2023-10-11

## 2023-10-11 PROBLEM — E03.9 HYPOTHYROIDISM, UNSPECIFIED: Chronic | Status: ACTIVE | Noted: 2023-10-09

## 2023-10-11 PROCEDURE — 76818 FETAL BIOPHYS PROFILE W/NST: CPT

## 2023-10-11 PROCEDURE — 76818 FETAL BIOPHYS PROFILE W/NST: CPT | Mod: 59

## 2023-10-18 ENCOUNTER — ASOB RESULT (OUTPATIENT)
Age: 38
End: 2023-10-18

## 2023-10-18 ENCOUNTER — APPOINTMENT (OUTPATIENT)
Dept: ANTEPARTUM | Facility: CLINIC | Age: 38
End: 2023-10-18
Payer: COMMERCIAL

## 2023-10-18 ENCOUNTER — APPOINTMENT (OUTPATIENT)
Dept: ANTEPARTUM | Facility: CLINIC | Age: 38
End: 2023-10-18

## 2023-10-18 PROCEDURE — 76818 FETAL BIOPHYS PROFILE W/NST: CPT | Mod: 59

## 2023-10-18 PROCEDURE — 76816 OB US FOLLOW-UP PER FETUS: CPT

## 2023-10-22 ENCOUNTER — TRANSCRIPTION ENCOUNTER (OUTPATIENT)
Age: 38
End: 2023-10-22

## 2023-10-23 ENCOUNTER — INPATIENT (INPATIENT)
Facility: HOSPITAL | Age: 38
LOS: 2 days | Discharge: HOME | End: 2023-10-26
Attending: OBSTETRICS & GYNECOLOGY | Admitting: OBSTETRICS & GYNECOLOGY
Payer: COMMERCIAL

## 2023-10-23 VITALS — DIASTOLIC BLOOD PRESSURE: 57 MMHG | SYSTOLIC BLOOD PRESSURE: 113 MMHG | HEART RATE: 102 BPM

## 2023-10-23 DIAGNOSIS — O34.211 MATERNAL CARE FOR LOW TRANSVERSE SCAR FROM PREVIOUS CESAREAN DELIVERY: ICD-10-CM

## 2023-10-23 DIAGNOSIS — Z98.891 HISTORY OF UTERINE SCAR FROM PREVIOUS SURGERY: Chronic | ICD-10-CM

## 2023-10-23 DIAGNOSIS — O30.042 TWIN PREGNANCY, DICHORIONIC/DIAMNIOTIC, SECOND TRIMESTER: ICD-10-CM

## 2023-10-23 PROCEDURE — 59514 CESAREAN DELIVERY ONLY: CPT | Mod: AS,U9

## 2023-10-23 PROCEDURE — 88307 TISSUE EXAM BY PATHOLOGIST: CPT | Mod: 26

## 2023-10-23 RX ORDER — KETOROLAC TROMETHAMINE 30 MG/ML
30 SYRINGE (ML) INJECTION EVERY 6 HOURS
Refills: 0 | Status: DISCONTINUED | OUTPATIENT
Start: 2023-10-23 | End: 2023-10-24

## 2023-10-23 RX ORDER — OXYCODONE HYDROCHLORIDE 5 MG/1
5 TABLET ORAL
Refills: 0 | Status: COMPLETED | OUTPATIENT
Start: 2023-10-24 | End: 2023-10-31

## 2023-10-23 RX ORDER — CEFAZOLIN SODIUM 1 G
2000 VIAL (EA) INJECTION EVERY 8 HOURS
Refills: 0 | Status: COMPLETED | OUTPATIENT
Start: 2023-10-23 | End: 2023-10-24

## 2023-10-23 RX ORDER — FERROUS SULFATE 325(65) MG
1 TABLET ORAL
Refills: 0 | DISCHARGE

## 2023-10-23 RX ORDER — HEPARIN SODIUM 5000 [USP'U]/ML
5000 INJECTION INTRAVENOUS; SUBCUTANEOUS EVERY 12 HOURS
Refills: 0 | Status: DISCONTINUED | OUTPATIENT
Start: 2023-10-23 | End: 2023-10-26

## 2023-10-23 RX ORDER — ACETAMINOPHEN 500 MG
1000 TABLET ORAL ONCE
Refills: 0 | Status: COMPLETED | OUTPATIENT
Start: 2023-10-23 | End: 2023-10-23

## 2023-10-23 RX ORDER — SODIUM CHLORIDE 9 MG/ML
1000 INJECTION, SOLUTION INTRAVENOUS
Refills: 0 | Status: DISCONTINUED | OUTPATIENT
Start: 2023-10-23 | End: 2023-10-26

## 2023-10-23 RX ORDER — OXYCODONE HYDROCHLORIDE 5 MG/1
5 TABLET ORAL ONCE
Refills: 0 | Status: DISCONTINUED | OUTPATIENT
Start: 2023-10-24 | End: 2023-10-26

## 2023-10-23 RX ORDER — IBUPROFEN 200 MG
600 TABLET ORAL EVERY 6 HOURS
Refills: 0 | Status: COMPLETED | OUTPATIENT
Start: 2023-10-24 | End: 2024-09-21

## 2023-10-23 RX ORDER — DIPHENHYDRAMINE HCL 50 MG
25 CAPSULE ORAL EVERY 6 HOURS
Refills: 0 | Status: DISCONTINUED | OUTPATIENT
Start: 2023-10-23 | End: 2023-10-26

## 2023-10-23 RX ORDER — MORPHINE SULFATE 50 MG/1
0.1 CAPSULE, EXTENDED RELEASE ORAL ONCE
Refills: 0 | Status: DISCONTINUED | OUTPATIENT
Start: 2023-10-23 | End: 2023-10-24

## 2023-10-23 RX ORDER — OXYTOCIN 10 UNIT/ML
333.33 VIAL (ML) INJECTION
Qty: 20 | Refills: 0 | Status: DISCONTINUED | OUTPATIENT
Start: 2023-10-23 | End: 2023-10-26

## 2023-10-23 RX ORDER — TETANUS TOXOID, REDUCED DIPHTHERIA TOXOID AND ACELLULAR PERTUSSIS VACCINE, ADSORBED 5; 2.5; 8; 8; 2.5 [IU]/.5ML; [IU]/.5ML; UG/.5ML; UG/.5ML; UG/.5ML
0.5 SUSPENSION INTRAMUSCULAR ONCE
Refills: 0 | Status: DISCONTINUED | OUTPATIENT
Start: 2023-10-23 | End: 2023-10-26

## 2023-10-23 RX ORDER — MAGNESIUM HYDROXIDE 400 MG/1
30 TABLET, CHEWABLE ORAL
Refills: 0 | Status: DISCONTINUED | OUTPATIENT
Start: 2023-10-23 | End: 2023-10-26

## 2023-10-23 RX ORDER — ACETAMINOPHEN 500 MG
975 TABLET ORAL
Refills: 0 | Status: DISCONTINUED | OUTPATIENT
Start: 2023-10-23 | End: 2023-10-26

## 2023-10-23 RX ORDER — SIMETHICONE 80 MG/1
80 TABLET, CHEWABLE ORAL EVERY 4 HOURS
Refills: 0 | Status: DISCONTINUED | OUTPATIENT
Start: 2023-10-23 | End: 2023-10-26

## 2023-10-23 RX ORDER — LANOLIN
1 OINTMENT (GRAM) TOPICAL EVERY 6 HOURS
Refills: 0 | Status: DISCONTINUED | OUTPATIENT
Start: 2023-10-23 | End: 2023-10-26

## 2023-10-23 RX ADMIN — FAMOTIDINE 20 MILLIGRAM(S): 10 INJECTION INTRAVENOUS at 14:48

## 2023-10-23 RX ADMIN — Medication 15 MILLILITER(S): at 14:48

## 2023-10-23 RX ADMIN — Medication 100 MILLIGRAM(S): at 22:22

## 2023-10-23 RX ADMIN — Medication 400 MILLIGRAM(S): at 17:58

## 2023-10-23 NOTE — OB PROVIDER DELIVERY SUMMARY - NSPROVIDERDELIVERYNOTE_OBGYN_ALL_OB_FT
scheduled rLTCS, pregnancy c/w twins    Viable twin A: female infant apgars 9/9, weight: 6 lbs 15oz            twin B: male infant, apgars 9/9, weight: 6 lbs 12oz  Hysterotomy closed in 1 layer using PDS  Grossly normal uterus, tubes, and ovaries  Abd. closed in standard fashion  patient and infant to recovery in stable condition  QBL: 609  IV: 2000  urine: 250 scheduled rLTCS, pregnancy c/w twins    Viable twin A: female infant apgars 9/9, weight: 6 lbs 15oz            twin B: male infant, apgars 9/9, weight: 6 lbs 12oz  Hysterotomy closed in 1 layer using PDS  Grossly normal uterus, tubes, and ovaries  Abd. closed in standard fashion  patient and infant to recovery in stable condition  QBL: 609  IV: 2000  urine: 250    Dictation: #[id number]

## 2023-10-23 NOTE — OB RN PATIENT PROFILE - SPO2 (%)
Schedule lung function test: [phone]    Schedule MRI of neck and back: [phone]    Schedule appointment with spine doctor - they will call you.      Thank you for choosing [facility] [place].  You may be receiving an email and/or telephone survey request from [facility] Customer Experience regarding your visit today.  Please take a few minutes to respond to the survey to let us know how we are doing.      If you have questions or concerns, please contact us via [employer] or you can contact your care team at [phone].    Our Clinic hours are:  Monday 6:40 am  to 7:00 pm  Tuesday -Friday 6:40 am to 5:00 pm    The [place] outpatient lab hours are:  Monday - Friday 6:10 am to 4:45 pm  Saturdays 7:00 am to 11:00 am  Appointments are required, call [phone]    If you have clinical questions after hours or would like to schedule an appointment,  call the clinic at [phone].     100

## 2023-10-23 NOTE — OB RN PATIENT PROFILE - NS_PRENATALLABSOURCEGBS1PN_OBGYN_ALL_OB
Vitals: Hypertensive 170/103, Coreg dose given, re check 169/96, readings per R calf. Other vitals stable.  Endocrine: n/a  Labs: Creatinine 9.98, Phosphorus 12.1.  Pain: Patient reports L graft arm pain, states Oxycodone does not work. He complained of numbness in the forearm, extremity is warm and skin is pink.   PRN's: Hydralazine 10 mg.   Diet: NPO for line placement, post procedure diet advanced to renal.  LDA: R PIV saline locked. L arm graft. R CVC placed for HD.   GI: BM 12/16.  : Oliguric, no void today.  Skin: L arm graft incision, dermabond.  Neuro: Pleasant, alert and oriented.  Mobility: Up ad abelardo.  Education: n/a  Plan: Dialysis this afternoon, discharge home in several days when outpatient dialysis plan is established.     unknown

## 2023-10-23 NOTE — OB RN DELIVERY SUMMARY - NSSELHIDDEN_OBGYN_ALL_OB_FT
[NS_DeliveryAttending1_OBGYN_ALL_OB_FT:[email or web address]=],[NS_DeliveryAssist1_OBGYN_ALL_OB_FT:[id number]==],[NS_DeliveryRN_OBGYN_ALL_OB_FT:[id number]==]

## 2023-10-23 NOTE — OB PROVIDER H&P - HISTORY OF PRESENT ILLNESS
History and Physical    The patient is a 37 y/o  EDC 2023 @ 38.1 weeks admitted to L&D for a scheduled rLTCS.  The pregnancy is c/w di/di twin gestation both cephalic presentation.  The patient denies contx, LOF, VB. endorses good fetal movement. The patient accepts all blood products    allergies: sulfa: unknown  meds: ASA 81 mg, PNV, Iron    GBS(10/3/2023): negative  EFW by sonogram on 10/18: fetus A: 6 lbs 2oz  cephalic presentation                                             fetus B: 6 lbs 12oz cephalic presentation  di/di twins

## 2023-10-23 NOTE — OB PROVIDER H&P - NSHPREVIEWOFSYSTEMS_GEN_ALL_CORE
ICU Vital Signs Last 24 Hrs  T(C): 37.1 (23 Oct 2023 14:06), Max: 37.1 (23 Oct 2023 14:06)  T(F): 98.8 (23 Oct 2023 14:06), Max: 98.8 (23 Oct 2023 14:06)  HR: 94 (23 Oct 2023 14:40) (84 - 102)  BP: 113/57 (23 Oct 2023 14:06) (113/57 - 113/57)  BP(mean): --  ABP: --  ABP(mean): --  RR: 18 (23 Oct 2023 14:06) (18 - 18)  SpO2: 98% (23 Oct 2023 14:40) (98% - 100%)    O2 Parameters below as of 23 Oct 2023 14:06  Patient On (Oxygen Delivery Method): room air        gen: A&Ox3  CV: rrr s1s2  abd: gravid soft  VE: deferred  EFM: twin A: 145 moderate variability, +acels, -decels          twin B: 140 moderate variability, -acels, -decels  toco: none  bedside sonogram: cephalic presentation on twin A and B

## 2023-10-23 NOTE — OB NEONATOLOGY/PEDIATRICIAN DELIVERY SUMMARY - NSPEDSNEONOTESA_OBGYN_ALL_OB_FT
Peds NP requested to attend delivery due to twin gestation for this 38.1wk Di-Di female twin "A" born on 10/23/23 at 1559 via repeat scheduled CS to a 35 y/o  blood type A+ mother.  Maternal history of hypothyroidism (no meds).  No significant prenatal history.  PNL HIV -/Hep B-/RPR non-reactive/Rubella immune, GBS - on 10/3/23.  AROM at TOD with clear fluids.  Baby emerged vigorous, crying, was warmed/ dried/ suctioned/ stimulated with APGARS of 9/9.  Mom plans to initiate breastfeeding, declines Hep B vaccine.  Highest maternal temp 37.1C with EOS na (intact/ no labor).  Admitted under Dr. Mann.
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
## 2023-10-23 NOTE — OB RN DELIVERY SUMMARY - NS_SEPSISRSKCALC_OBGYN_ALL_OB_FT
No temperature has been documented for this patient in CPN or on the OB Flowsheet. Ensure the highest temperature during labor was documented on the OB Flowsheet.  No gestational age at birth has been documented. Ensure delivery date/time has been entered above.  Rupture of membranes must be entered above.  'Type of intrapartum antibiotics' must be entered above.   EOS calculated successfully. EOS Risk Factor: 0.5/1000 live births ([facility] national incidence); GA=38w1d; Temp=98.8; ROM=0.017; GBS='Negative'; Antibiotics='No antibiotics or any antibiotics < 2 hrs prior to birth'

## 2023-10-23 NOTE — OB RN DELIVERY SUMMARY - NS_SPECIMENS_OBGYN_ALL_OB
After Visit Summary   3/6/2017    Brad Singh    MRN: [id number]           Patient Information     Date Of Birth          1981        Visit Information        Provider Department      3/6/2017 2:00 PM Mitchel Head MD [facility]        Today's Diagnoses     Routine general medical examination at a health care facility    -  1    Other specified counseling        Screening for lipoid disorders        Screening for diabetes mellitus        Vaccine counseling          Care Instructions      Preventive Health Recommendations  Male Ages 26 - 39    Yearly exam:             See your health care provider every year in order to  o   Review health changes.   o   Discuss preventive care.    o   Review your medicines if your doctor has prescribed any.    You should be tested each year for STDs (sexually transmitted diseases), if you re at risk.     After age 35, talk to your provider about cholesterol testing. If you are at risk for heart disease, have your cholesterol tested at least every 5 years.     If you are at risk for diabetes, you should have a diabetes test (fasting glucose).  Shots: Get a flu shot each year. Get a tetanus shot every 10 years.     Nutrition:    Eat at least 5 servings of fruits and vegetables daily.     Eat whole-grain bread, whole-wheat pasta and brown rice instead of white grains and rice.     Talk to your provider about Calcium and Vitamin D.     Lifestyle    Exercise for at least 150 minutes a week (30 minutes a day, 5 days a week). This will help you control your weight and prevent disease.     Limit alcohol to one drink per day.     No smoking.     Wear sunscreen to prevent skin cancer.     See your dentist every six months for an exam and cleaning.           Follow-ups after your visit        Who to contact     If you have questions or need follow up information about today's clinic visit or your schedule please contact [facility]  "directly at [phone].  Normal or non-critical lab and imaging results will be communicated to you by MyChart, letter or phone within 4 business days after the clinic has received the results. If you do not hear from us within 7 days, please contact the clinic through [employer] or phone. If you have a critical or abnormal lab result, we will notify you by phone as soon as possible.  Submit refill requests through [employer] or call your pharmacy and they will forward the refill request to us. Please allow 3 business days for your refill to be completed.          Additional Information About Your Visit        [employer]SongToYou Information     [employer] lets you send messages to your doctor, view your test results, renew your prescriptions, schedule appointments and more. To sign up, go to [email or web address] . Click on \"Log in\" on the left side of the screen, which will take you to the Welcome page. Then click on \"Sign up Now\" on the right side of the page.     You will be asked to enter the access code listed below, as well as some personal information. Please follow the directions to create your username and password.     Your access code is: [email or web address]  Expires: 2017  2:31 PM     Your access code will  in 90 days. If you need help or a new code, please call your [facility] clinic or [phone].        Care EveryWhere ID     This is your Care EveryWhere ID. This could be used by other organizations to access your [facility] medical records  [id number]        Your Vitals Were     Pulse Temperature Height Pulse Oximetry BMI (Body Mass Index)       66 98.1  F (36.7  C) (Oral) 5' 10\" (1.778 m) 100% 20.23 kg/m2        Blood Pressure from Last 3 Encounters:   17 132/78   11/12/15 110/64   05/11/15 102/76    Weight from Last 3 Encounters:   17 141 lb (64 kg)   11/12/15 140 lb (63.5 kg)   05/11/15 134 lb 11.2 oz (61.1 kg)              We Performed the Following     Glucose, whole blood     HEPATITIS A VACCINE " (ADULT)     Lipid Profile (Chol, Trig, HDL, LDL calc)     TDAP (ADACEL AGES 11-64)          Today's Medication Changes          These changes are accurate as of: 3/6/17  2:32 PM.  If you have any questions, ask your nurse or doctor.               Start taking these medicines.        Dose/Directions    typhoid polysaccharide 25 MCG/0.5ML injection   Commonly known as:  TYPHIM VI   Used for:  Other specified counseling   Started by:  Mitchel Head MD        Dose:  0.5 mL   Inject 0.5 mLs into the muscle once for 1 dose   Quantity:  0.5 mL   Refills:  0            Where to get your medicines      Some of these will need a paper prescription and others can be bought over the counter.  Ask your nurse if you have questions.     You don't need a prescription for these medications     typhoid polysaccharide 25 MCG/0.5ML injection                Primary Care Provider Office Phone # Fax #    Jalen Lawson [phone]       [facility] [address] [place] [address]        Thank you!     Thank you for choosing [facility]  for your care. Our goal is always to provide you with excellent care. Hearing back from our patients is one way we can continue to improve our services. Please take a few minutes to complete the written survey that you may receive in the mail after your visit with us. Thank you!             Your Updated Medication List - Protect others around you: Learn how to safely use, store and throw away your medicines at www.disposemymeds.org.          This list is accurate as of: 3/6/17  2:32 PM.  Always use your most recent med list.                   Brand Name Dispense Instructions for use    typhoid polysaccharide 25 MCG/0.5ML injection    TYPHIM VI    0.5 mL    Inject 0.5 mLs into the muscle once for 1 dose          Yes

## 2023-10-23 NOTE — OB NEONATOLOGY/PEDIATRICIAN DELIVERY SUMMARY - NSPEDSNEONOTESB_OBGYN_ALL_OB_FT
Requested by OB to attend this repeat  at 38 + 1  weeks.  Mother is a 36 +1 year old, GP, blood type. Prenatal labs as follow: HIV neg, RPR non-reactive, rubella immune, HBsA neg, GBS neg on 10/3/23 . Maternal history significant for hypothyroidism no meds. This pregnancy was uncomplicated.  ROM at delivery with clear fluid.  Infant emerged vertex/breech, vigorous, with good tone. Delayed cord clamping X  secs, then brought to warmer. Dried, suctioned and stimulated.  Apgars   . Mom wishes to breast/bottle feed. Consents to/Declines Hep B vaccine. Consents to/Declines circumcision. Infant admitted to NBN for routine care/NICU for further management of care. Parents updated. Requested by OB to attend this repeat  at 38 + 1 weeks. Di-di twin gestation.  Mother is a 36 year old, , blood type A+. Prenatal labs as follows: HIV neg, RPR non-reactive, rubella immune, HBsA neg, GBS neg on 10/3/23. Maternal history significant for hypothyroidism no meds. This pregnancy was uncomplicated.  ROM at delivery with clear fluid.  Infant emerged vertex/breech, vigorous, with good tone. Dried, suctioned and stimulated.  Apgars 9&9. Mom wishes to exclusively breast feed. Consents to/Declines Hep B vaccine. Infant admitted to NBN for routine care. Parents updated.

## 2023-10-23 NOTE — OB PROVIDER H&P - NSLOWPPHRISK_OBGYN_A_OB
No previous uterine incision/Lora Pregnancy/Less than or equal to 4 previous vaginal births/No known bleeding disorder/No history of postpartum hemorrhage/No other PPH risks indicated

## 2023-10-23 NOTE — OB PROVIDER DELIVERY SUMMARY - NSSELHIDDEN_OBGYN_ALL_OB_FT
[NS_DeliveryAttending1_OBGYN_ALL_OB_FT:[email or web address]=],[NS_DeliveryAssist1_OBGYN_ALL_OB_FT:[id number]==],[NS_DeliveryRN_OBGYN_ALL_OB_FT:[id number]==]

## 2023-10-23 NOTE — OB RN INTRAOPERATIVE NOTE - NSSELHIDDEN_OBGYN_ALL_OB_FT
[NS_DeliveryAttending1_OBGYN_ALL_OB_FT:[email or web address]=],[NS_DeliveryAssist1_OBGYN_ALL_OB_FT:[id number]==],[NS_DeliveryRN_OBGYN_ALL_OB_FT:[id number]==]

## 2023-10-23 NOTE — OB PROVIDER H&P - NSMODPPHRISK_OBGYN_A_OB
Prior  birth(s) or uterine surgery/Over-distended uterus: Multiple gestation, polyhydramnios, Macrosomia/More than 4 previous vaginal births/AMA - over 35 years of age/Aspirin use in pregnancy

## 2023-10-23 NOTE — OB RN PATIENT PROFILE - NSSDOHFOODLAST_OBGYN_A_OB
[facility]  Neurology Consult    Patient Name: Brandi Flynn  MRN: [id number]  : 1938  TODAY'S DATE: 2022  ADMIT DATE: 2022  6:17 PM                                          CONSULTED PROVIDER: Deja Travis MD, Neurologist. Cellphone: [phone]  CONSULT REQUESTED BY: Dr. Sevilla    Chief Complaint   Patient presents with    Altered Mental Status     States became confused today at 4pm , better now       HPI per EMR:   84-year-old female with history of hypertension, hyperlipidemia, GERD, sleep apnea  Presented to the emergency department with sudden onset of confusion around 430 PM today.  Was in her usual state of health earlier. Per her family, she was confused and did not remembering about things that they previously discussed.  No focal deficits noted.  Family reports that she is now back to normal.  Denies recent illness, fever, chills, cough, shortness of breath, chest pain, abdominal pain, nausea, vomiting, diarrhea, urinary symptoms.     Laboratory studies were unremarkable.  EKG:  Sinus rhythm, left axis deviation, left bundle branch block (old)  CT head negative.  CTA head neck:  1. Focal atherosclerotic calcification at the M1 segment of the left middle cerebral artery. While difficult to quantify because of vessel size, there is probable hemodynamically significant stenosis at this level. 2. Atherosclerotic calcification of the extracranial carotid systems as discussed, without high-grade stenosis. 3. Atherosclerotic calcification and stenosis of less than 50% involving the intracranial ICA on the right.      Neurology Consult:  Patient was seen and examined by me today. She is an 84-year-old female with history of hypertension, hyperlipidemia, GERD, sleep apnea who presented to the ED with a transient episode of confusion and disorientation. Patient cannot recall event. As per daughter, BP was very high (200/100 mmHg) when symptoms started. The whole event lasted  about an hour, after which it was completely resolved. Patient is now back to baseline and denies any new neuro deficits. She does have a history of TIA in the past.    Review of Systems:  A comprehensive ROS was performed and all systems were negative except as noted above in HPI.    Past Medical History:  has a past medical history of Blood transfusion (), GERD (gastroesophageal reflux disease), Hepatitis C, History of hepatitis C, Hyperlipidemia, Hypertension, Hypothyroidism, Obesity, Primary osteoarthritis of left knee (2015), Sleep apnea, Stroke, TIA (transient ischemic attack), and Vision loss of right eye ().    Past Surgical History:  has a past surgical history that includes Incontinence surgery (); Cholecystectomy (); Laparoscopic gastric banding (); Hysterectomy ();  section; Hemorrhoid surgery; Tonsillectomy; Adenoidectomy; Colonoscopy (2011); Laparoscopic Nissen fundoplication; Breast biopsy (); Cataract extraction, bilateral (Bilateral); Abdominal surgery (); Eye surgery (); and Eye surgery (10/2020).    Family Medical History: family history includes Arthritis in her daughter, mother, and paternal grandmother; Cancer in her maternal uncle and paternal grandfather; Diabetes in her mother and sister; Diverticulitis in her sister; Early death (age of onset: 47) in her maternal grandfather; Emphysema in her maternal aunt; Heart disease in her maternal aunt, maternal grandfather, mother, and paternal grandfather; No Known Problems in her brother, daughter, father, maternal grandmother, paternal uncle, and son.    Social History:  reports that she has quit smoking. She has a 2.50 pack-year smoking history. She has never used smokeless tobacco. She reports that she does not drink alcohol and does not use drugs.    PCP: Marcial Kan MD    Allergies:   Review of patient's allergies indicates:   Allergen Reactions    Ace inhibitors Other (See Comments)      cough    Morphine Itching    Keflex [cephalexin] Itching and Rash       Medications:   No current facility-administered medications on file prior to encounter.     Current Outpatient Medications on File Prior to Encounter   Medication Sig Dispense Refill    amLODIPine (NORVASC) 5 MG tablet Take 1 tablet (5 mg total) by mouth once daily. 90 tablet 5    aspirin (ECOTRIN) 81 MG EC tablet Take 81 mg by mouth once daily.      atorvastatin (LIPITOR) 40 MG tablet TAKE 2 TABLETS BY MOUTH ONCE A DAY (Patient taking differently: Take 40 mg by mouth every evening.) 180 tablet 1    clopidogreL (PLAVIX) 75 mg tablet Take 1 tablet (75 mg total) by mouth once daily. 90 tablet 3    levothyroxine (SYNTHROID) 75 MCG tablet Take 1 tablet (75 mcg total) by mouth once daily. 90 tablet 3    solifenacin (VESICARE) 5 MG tablet Take 5 mg by mouth once daily.      VIT A/VIT C/VIT E/ZINC/COPPER (PRESERVISION AREDS ORAL) Take 1 capsule by mouth 2 (two) times daily.      mag hydrox/aluminum hyd/simeth (MAALOX ORAL) Take 1 Dose by mouth as needed.         Physical Exam  Current Vitals:  Vitals:    04/26/22 0739   BP: (!) 159/76   Pulse: 61   Resp: 19   Temp: 97.9 °F (36.6 °C)     Physical Exam:  General: AO x4  HEENT: PERRL, EOMI  CV: RRR  Lungs: CTAB  Abdomen: +BS, S, NT, ND    Neurological Exam    MENTAL STATUS EXAM:  Level of alertness: Alert  Level of attention: Attentive w/out deficit  Orientation/Awareness: intact to person, place, time, situation  Language: fluent. Comprehension/repetition/naming intact    CRANIAL NERVE EXAM:  II/III: fundoscopic exam deferred, PERRL; visual fields full to confrontation  III/IV/VI: EOMI w/out evidence of nystagmus, strabismus, or evoked diplopia  V: no deficits appreciated to pinprick, temp, vibration; masseter strength intact bilaterally  VII: no facial asymmetry noted  VIII: no deficits in hearing bilaterally  IX/X: palate @ ML and raises symmetrically  XI: shoulder shrug 5/5 bilaterally;  head turn 5/5 bilaterally  XII: tongue to midline w/out asymmetry  No dysarthria noted on exam.    MOTOR EXAM:  Bulk and Tone: normal throughout  Strength is 5/5 throughout without deficits.  No pronator drift in bilateral UE. No tremor either at rest or with intention.    REFLEXES:  Masseter (CN V) Not Tested  2+ in bilateral upper and lower extremities, no Florez's, no clonus. Downgoing plantars.      SENSORY EXAM  Light touch and vibration are intact throughout in upper and lower extremities without deficits.    COORDINATION/CEREBELLAR EXAM:  FTN: no dysmetria or other signs of appendicular ataxia  HTS: No evidence of appendicular ataxia    GAIT:  Deferred for safety.    Laboratory Data & Studies    Recent Labs   Lab 04/25/22 1836 04/26/22  0433   WBC 6.17 5.30   HGB 13.9 13.2    174   MCV 95 92       Recent Labs   Lab 04/25/22 1836 04/26/22  0433    142   K 3.5 3.4*    106   CO2 24 27   BUN 16 12   * 103   CALCIUM 8.7 8.9   MG  --  2.0   PHOS  --  4.0       Recent Labs   Lab 04/25/22 1836 04/26/22  0433   PROT 6.8 6.3   ALBUMIN 4.0 3.7   BILITOT 0.8 1.1*   AST 23 21   ALT 15 13   ALKPHOS 83 75       Recent Labs   Lab 04/25/22 1836   LABPT 13.1   INR 1.1       Recent Labs   Lab 04/25/22 1836 04/26/22  0434   HGBA1C  --  5.6   CHOL 115*  --    TRIG 58  --    LDLCALC 53.4*  --    HDL 50  --    TSH 2.190  --          Imaging:  CT Head Without Contrast    Result Date: 4/25/2022  CT head without contrast CLINICAL DATA: Neurological deficit, stroke suspected CMS MANDATED QUALITY DATA - CT RADIATION  436 All CT scans at this facility utilize dose modulation, iterative reconstruction, and/or weight based dosing when appropriate to reduce radiation dose to as low as reasonably achievable. Findings: Thin section axial noncontrast images were obtained. There is no evidence of intracranial mass, hemorrhage, or midline shift. Ventricles and sulci are within normal limits. There are no  pathologic extra-axial fluid collections. There is no evidence of cortical ischemic change. Changes of mild chronic microvascular white matter disease are noted. There is a small chronic lacunar infarct at the right caudate nucleus. The cerebellum and brainstem are unremarkable. IMPRESSION: 1. No acute intracranial abnormalities. Chronic white matter ischemic changes as above. Findings were called to Dr. Sevilla at 6:56 PM on April 25, 2022 Electronically signed by:  Mj John MD  4/25/2022 6:57 PM CDT Workstation: [id number]    MRA Brain    Result Date: 4/25/2022  EXAM:  MR ANGIOGRAPHY HEAD WITHOUT INTRAVENOUS CONTRAST CLINICAL INDICATION:  84 years old Female; AMS. TECHNIQUE:  Magnetic resonance angiography images of the head without intravenous contrast. COMPARISON:  No relevant prior studies available. FINDINGS: RIGHT INTERNAL CAROTID ARTERY:  No acute findings.  Intracranial segment is patent with no significant stenosis.  No aneurysm. RIGHT ANTERIOR CEREBRAL ARTERY:  Absent or hypoplastic right CYNDEE A1 segment. No occlusion or significant stenosis.  No aneurysm. RIGHT MIDDLE CEREBRAL ARTERY:  No acute findings.  No occlusion or significant stenosis.  No aneurysm. RIGHT POSTERIOR CEREBRAL ARTERY:  No acute findings.  No occlusion or significant stenosis.  No aneurysm. RIGHT VERTEBRAL ARTERY:  Unremarkable as visualized. LEFT INTERNAL CAROTID ARTERY:  No acute findings.  Intracranial segment is patent with no significant stenosis.  No aneurysm. LEFT ANTERIOR CEREBRAL ARTERY:  No acute findings.  No occlusion or significant stenosis.  No aneurysm. LEFT MIDDLE CEREBRAL ARTERY:  No acute findings.  No occlusion or significant stenosis.  No aneurysm. LEFT POSTERIOR CEREBRAL ARTERY:  No acute findings.  No occlusion or significant stenosis.  No aneurysm. LEFT VERTEBRAL ARTERY:  Unremarkable as visualized. BASILAR ARTERY:  No acute findings.  No occlusion or significant stenosis.  No aneurysm. IMPRESSION: No  acute findings in the arteries of the head. Electronically signed by:  Emigdio Vargas DO  4/25/2022 10:48 PM CDT Workstation: [id number]    MRI BRAIN WITHOUT CONTRAST    Result Date: 4/25/2022  EXAM:  MR HEAD WITHOUT INTRAVENOUS CONTRAST CLINICAL INDICATION:  84 years old Female; AMS. TECHNIQUE:  Magnetic resonance images of the head/brain without intravenous contrast in multiple planes. COMPARISON: CT 4/25/2022. FINDINGS: BRAIN:  No acute infarct. No acute intracranial hemorrhage. Chronic white matter changes and generalized volume loss. No mass, mass effect, midline shift, or edema. VENTRICLES:  No acute findings.  No ventriculomegaly. BONES/JOINTS:  Hyperostosis interna. SINUSES:  Unremarkable as visualized.  No acute sinusitis. MASTOID AIR CELLS: Scattered nonspecific fluid. ORBITS:  Unremarkable as visualized. IMPRESSION: No acute findings in the head/brain. Electronically signed by:  Emigdio Vargas DO  4/25/2022 10:52 PM CDT Workstation: [id number]    X-Ray Chest AP Portable    Result Date: 4/25/2022  Chest single view CLINICAL DATA: Altered mental status FINDINGS: Comparison to December 24. Heart size is upper normal. Aortic arch is calcified. There is mild scarring or atelectasis at the right lung base. Left lung is clear. There are no pleural effusions. IMPRESSION: 1. Mild scarring or atelectasis at the right lung base. 2. Aortic atherosclerosis. Electronically signed by:  Mj John MD  4/25/2022 8:04 PM CDT Workstation: [id number]    CTA Head and Neck (xpd)    Result Date: 4/25/2022  CTA HEAD AND NECK CLINICAL DATA: CVA CMS MANDATED QUALITY DATA - CT RADIATION  436 All CT scans at this facility utilize dose modulation, iterative reconstruction, and/or weight based dosing when appropriate to reduce radiation dose to as low as reasonably achievable. CMS MANDATED QUALITY DATA - CAROTID - 195 All measurements and percent stenosis described below were determined using NASCET criteria or criteria  similar to NASCET, as defined by the Society of Radiologists in Ultrasound Consensus Conference, Radiology, 2003 CT angiography of the head and neck was performed. 100 mL nonionic contrast was administered. 3-D multiplanar reconstruction images were obtained and stored as part of the patient's permanent medical record. CTA NECK: Aortic arch is calcified. There is mild atherosclerotic calcification at the great vessel origins. The right common carotid artery is normal in course. There is moderate calcification at the carotid bulb and ICA origin, where there is mild stenosis of up to 20%. The cervical ICA distal to that level is within normal limits. On the left, the common carotid artery is normal in course. There is dense calcification at the carotid bulb, with mild luminal diameter narrowing of up to 40%. The internal carotid artery is calcified proximally but normal in caliber. The bilateral vertebral arteries are patent. There is atherosclerotic calcification at the left vertebral artery origin where there is evidence of mild stenosis. CTA BRAIN: The intracranial portions of the internal carotid arteries are patent. The left intracranial ICA is normal in caliber. Luminal diameter narrowing on the right of less than 50% is noted. The basilar artery is a And within normal limits. The A1 segment of the right anterior cerebral artery is absent, felt to reflect a developmental variant, with both anterior cerebral arteries normally opacified and filling on the right via a patent anterior indicating arteries. There is no hemodynamically significant stenosis, major branch occlusion, or aneurysm. The middle cerebral arteries are patent. There is atherosclerotic calcification focally at the M1 segment on the left, where there is probable moderate luminal diameter narrowing. The posterior cerebral arteries are patent and within normal limits. IMPRESSION: 1. Focal atherosclerotic calcification at the M1 segment of the left  middle cerebral artery. While difficult to quantify because of vessel size, there is probable hemodynamically significant stenosis at this level. 2. Atherosclerotic calcification of the extracranial carotid systems as discussed, without high-grade stenosis. 3. Atherosclerotic calcification and stenosis of less than 50% involving the intracranial ICA on the right. 4. Additional findings as above. Electronically signed by:  Mj John MD  4/25/2022 7:24 PM CDT Workstation: [id number]        Assessment and Plan:    Transient alteration of awareness: transient global amnesia vs hypertensive encephalopathy vs TIA    Ms. Flynn is an 84-year-old female with history of hypertension, hyperlipidemia, GERD, sleep apnea who presented to the ED with a transient episode of confusion and disorientation that lasted about an hour. Differential includes TIA, TGA or hypertensive emergency, since she did have very elevated blood pressure during the event. She has no recollection of it. Seizure is very low on the differential, as she did not lose consciousness or had any visible neuro deficits. Patient is now back to her baseline, and exam is non focal.  - MRI brain was performed and showed no evidence of acute ischemic stroke. It did show some microvascular disease and mild atrophy  - MRA showed no LVO, AVM or aneurysm  - Decrease ASA to 81 mg and continue with plavix 75 mg and atorvastatin 80 mg for secondary stroke prevention  - Treatment of metabolic derangements and blood pressure as per primary team  - Counseled patient to return to cardiology for management of HTN  - Follow up in clinic after discharge. Will consider ordering EEG as outpatient   - No further neuro workup as inpatient is needed. Please call with questions    · DVT prophylaxis with chemo/SCD prophylaxis  · Extensively discussed lifestyle modifications as prophylactic measures for stroke prevention including smoking cessation, adequate blood pressure  management, healthy diet and regular exercise.    Patient to follow up with [facility] at [phone] within 3 days from discharge.     Stroke education was provided including stroke risk factors modification and any acute neurological changes including weakness, confusion, visual changes to come straight to the ER.     All questions were answered.                              Thank you kindly for including us in the care of this patient. Please do not hesitate to contact us with any questions.    75 minutes of care time has been spent evaluating with the patient. Time includes chart review not limited to diagnostic imaging, labs, and vitals, patient assessment, discussion with family and nursing, current order evaluations, and new order entries.      Deja Travis MD  Neurology  Cellphone: [phone]   never true

## 2023-10-23 NOTE — OB RN DELIVERY SUMMARY - NS_GENERALBABYBCOMMENT_OBGYN_ALL_OB_FT
skin toskin to be done in rR skin toskin to be done in rR, mom feeling nauseated mom feeling nauseated, Baby A fed first, Baby B latched second within 2 hours of delivery.

## 2023-10-23 NOTE — OB PROVIDER H&P - ASSESSMENT
37 y/o  @ 38.1 weeks admitted for a scheduled rLTCS  -admit to L&D  -routine labs  -NPO bicitra  -EFM/toco  -IV hydration  -ancef  -anesthesia consult  -anticipated c/s    d/w Dr. Naldo Sharif NP

## 2023-10-24 ENCOUNTER — APPOINTMENT (OUTPATIENT)
Dept: ANTEPARTUM | Facility: CLINIC | Age: 38
End: 2023-10-24

## 2023-10-24 LAB
BASOPHILS # BLD AUTO: 0.02 K/UL — SIGNIFICANT CHANGE UP (ref 0–0.2)
BASOPHILS # BLD AUTO: 0.02 K/UL — SIGNIFICANT CHANGE UP (ref 0–0.2)
BASOPHILS NFR BLD AUTO: 0.2 % — SIGNIFICANT CHANGE UP (ref 0–2)
BASOPHILS NFR BLD AUTO: 0.2 % — SIGNIFICANT CHANGE UP (ref 0–2)
EOSINOPHIL # BLD AUTO: 0.14 K/UL — SIGNIFICANT CHANGE UP (ref 0–0.5)
EOSINOPHIL # BLD AUTO: 0.14 K/UL — SIGNIFICANT CHANGE UP (ref 0–0.5)
EOSINOPHIL NFR BLD AUTO: 1.3 % — SIGNIFICANT CHANGE UP (ref 0–6)
EOSINOPHIL NFR BLD AUTO: 1.3 % — SIGNIFICANT CHANGE UP (ref 0–6)
HCT VFR BLD CALC: 30.4 % — LOW (ref 34.5–45)
HCT VFR BLD CALC: 30.4 % — LOW (ref 34.5–45)
HGB BLD-MCNC: 10.4 G/DL — LOW (ref 11.5–15.5)
HGB BLD-MCNC: 10.4 G/DL — LOW (ref 11.5–15.5)
IMM GRANULOCYTES NFR BLD AUTO: 0.9 % — SIGNIFICANT CHANGE UP (ref 0–0.9)
IMM GRANULOCYTES NFR BLD AUTO: 0.9 % — SIGNIFICANT CHANGE UP (ref 0–0.9)
LYMPHOCYTES # BLD AUTO: 1.66 K/UL — SIGNIFICANT CHANGE UP (ref 1–3.3)
LYMPHOCYTES # BLD AUTO: 1.66 K/UL — SIGNIFICANT CHANGE UP (ref 1–3.3)
LYMPHOCYTES # BLD AUTO: 15.1 % — SIGNIFICANT CHANGE UP (ref 13–44)
LYMPHOCYTES # BLD AUTO: 15.1 % — SIGNIFICANT CHANGE UP (ref 13–44)
MCHC RBC-ENTMCNC: 31.5 PG — SIGNIFICANT CHANGE UP (ref 27–34)
MCHC RBC-ENTMCNC: 31.5 PG — SIGNIFICANT CHANGE UP (ref 27–34)
MCHC RBC-ENTMCNC: 34.2 GM/DL — SIGNIFICANT CHANGE UP (ref 32–36)
MCHC RBC-ENTMCNC: 34.2 GM/DL — SIGNIFICANT CHANGE UP (ref 32–36)
MCV RBC AUTO: 92.1 FL — SIGNIFICANT CHANGE UP (ref 80–100)
MCV RBC AUTO: 92.1 FL — SIGNIFICANT CHANGE UP (ref 80–100)
MONOCYTES # BLD AUTO: 0.62 K/UL — SIGNIFICANT CHANGE UP (ref 0–0.9)
MONOCYTES # BLD AUTO: 0.62 K/UL — SIGNIFICANT CHANGE UP (ref 0–0.9)
MONOCYTES NFR BLD AUTO: 5.6 % — SIGNIFICANT CHANGE UP (ref 2–14)
MONOCYTES NFR BLD AUTO: 5.6 % — SIGNIFICANT CHANGE UP (ref 2–14)
NEUTROPHILS # BLD AUTO: 8.44 K/UL — HIGH (ref 1.8–7.4)
NEUTROPHILS # BLD AUTO: 8.44 K/UL — HIGH (ref 1.8–7.4)
NEUTROPHILS NFR BLD AUTO: 76.9 % — SIGNIFICANT CHANGE UP (ref 43–77)
NEUTROPHILS NFR BLD AUTO: 76.9 % — SIGNIFICANT CHANGE UP (ref 43–77)
NRBC # BLD: 0 /100 WBCS — SIGNIFICANT CHANGE UP (ref 0–0)
NRBC # BLD: 0 /100 WBCS — SIGNIFICANT CHANGE UP (ref 0–0)
PLATELET # BLD AUTO: 201 K/UL — SIGNIFICANT CHANGE UP (ref 150–400)
PLATELET # BLD AUTO: 201 K/UL — SIGNIFICANT CHANGE UP (ref 150–400)
RBC # BLD: 3.3 M/UL — LOW (ref 3.8–5.2)
RBC # BLD: 3.3 M/UL — LOW (ref 3.8–5.2)
RBC # FLD: 14.3 % — SIGNIFICANT CHANGE UP (ref 10.3–14.5)
RBC # FLD: 14.3 % — SIGNIFICANT CHANGE UP (ref 10.3–14.5)
T PALLIDUM AB TITR SER: NEGATIVE — SIGNIFICANT CHANGE UP
T PALLIDUM AB TITR SER: NEGATIVE — SIGNIFICANT CHANGE UP
WBC # BLD: 10.98 K/UL — HIGH (ref 3.8–10.5)
WBC # BLD: 10.98 K/UL — HIGH (ref 3.8–10.5)
WBC # FLD AUTO: 10.98 K/UL — HIGH (ref 3.8–10.5)
WBC # FLD AUTO: 10.98 K/UL — HIGH (ref 3.8–10.5)

## 2023-10-24 RX ORDER — SODIUM CHLORIDE 9 MG/ML
1000 INJECTION, SOLUTION INTRAVENOUS ONCE
Refills: 0 | Status: COMPLETED | OUTPATIENT
Start: 2023-10-24 | End: 2023-10-24

## 2023-10-24 RX ORDER — IBUPROFEN 200 MG
600 TABLET ORAL EVERY 6 HOURS
Refills: 0 | Status: DISCONTINUED | OUTPATIENT
Start: 2023-10-24 | End: 2023-10-26

## 2023-10-24 RX ORDER — OXYCODONE HYDROCHLORIDE 5 MG/1
5 TABLET ORAL
Refills: 0 | Status: DISCONTINUED | OUTPATIENT
Start: 2023-10-24 | End: 2023-10-26

## 2023-10-24 RX ADMIN — Medication 975 MILLIGRAM(S): at 21:15

## 2023-10-24 RX ADMIN — SODIUM CHLORIDE 1000 MILLILITER(S): 9 INJECTION, SOLUTION INTRAVENOUS at 00:46

## 2023-10-24 RX ADMIN — SIMETHICONE 80 MILLIGRAM(S): 80 TABLET, CHEWABLE ORAL at 21:15

## 2023-10-24 RX ADMIN — Medication 100 MILLIGRAM(S): at 14:01

## 2023-10-24 RX ADMIN — HEPARIN SODIUM 5000 UNIT(S): 5000 INJECTION INTRAVENOUS; SUBCUTANEOUS at 15:10

## 2023-10-24 RX ADMIN — Medication 975 MILLIGRAM(S): at 16:00

## 2023-10-24 RX ADMIN — Medication 600 MILLIGRAM(S): at 19:02

## 2023-10-24 RX ADMIN — Medication 30 MILLIGRAM(S): at 00:46

## 2023-10-24 RX ADMIN — Medication 975 MILLIGRAM(S): at 15:11

## 2023-10-24 RX ADMIN — Medication 975 MILLIGRAM(S): at 04:00

## 2023-10-24 RX ADMIN — Medication 975 MILLIGRAM(S): at 10:00

## 2023-10-24 RX ADMIN — Medication 100 MILLIGRAM(S): at 06:27

## 2023-10-24 RX ADMIN — Medication 975 MILLIGRAM(S): at 03:17

## 2023-10-24 RX ADMIN — Medication 30 MILLIGRAM(S): at 01:30

## 2023-10-24 RX ADMIN — Medication 600 MILLIGRAM(S): at 23:50

## 2023-10-24 RX ADMIN — Medication 600 MILLIGRAM(S): at 18:02

## 2023-10-24 RX ADMIN — Medication 30 MILLIGRAM(S): at 11:54

## 2023-10-24 RX ADMIN — Medication 30 MILLIGRAM(S): at 12:45

## 2023-10-24 RX ADMIN — Medication 975 MILLIGRAM(S): at 09:17

## 2023-10-24 RX ADMIN — Medication 30 MILLIGRAM(S): at 06:27

## 2023-10-24 RX ADMIN — Medication 975 MILLIGRAM(S): at 22:15

## 2023-10-24 RX ADMIN — HEPARIN SODIUM 5000 UNIT(S): 5000 INJECTION INTRAVENOUS; SUBCUTANEOUS at 03:18

## 2023-10-24 NOTE — LACTATION INITIAL EVALUATION - LACTATION INTERVENTIONS
initiate/review safe skin-to-skin/initiate/review pumping guidelines and safe milk handling/reverse pressure softening/initiate/review techniques for position and latch/post discharge community resources provided/initiate/review supplementation plan due to medical indications/review techniques to increase milk supply/review techniques to manage sore nipples/engorgement/reviewed components of an effective feeding and at least 8 effective feedings per day required/reviewed importance of monitoring infant diapers, the breastfeeding log, and minimum output each day/reviewed benefits and recommendations for rooming in/reviewed feeding on demand/by cue at least 8 times a day/recommended follow-up with pediatrician within 24 hours of discharge/reviewed indications of inadequate milk transfer that would require supplementation

## 2023-10-24 NOTE — PROGRESS NOTE ADULT - SUBJECTIVE AND OBJECTIVE BOX
Day 1 of Anesthesia Pain Management Service    SUBJECTIVE: Doing ok  Pain Scale Score:          [X] Refer to charted pain scores    THERAPY:    s/p    100 mcg PF morphine on 10\23\2023      MEDICATIONS  (STANDING):  acetaminophen     Tablet .. 975 milliGRAM(s) Oral <User Schedule>  ceFAZolin   IVPB 2000 milliGRAM(s) IV Intermittent every 8 hours  diphtheria/tetanus/pertussis (acellular) Vaccine (Adacel) 0.5 milliLiter(s) IntraMuscular once  heparin   Injectable 5000 Unit(s) SubCutaneous every 12 hours  ketorolac   Injectable 30 milliGRAM(s) IV Push every 6 hours  lactated ringers. 1000 milliLiter(s) (125 mL/Hr) IV Continuous <Continuous>  morphine PF Spinal 0.1 milliGRAM(s) IntraThecal. once  oxytocin Infusion 333.333 milliUNIT(s)/Min (1000 mL/Hr) IV Continuous <Continuous>    MEDICATIONS  (PRN):  diphenhydrAMINE 25 milliGRAM(s) Oral every 6 hours PRN Pruritus  lanolin Ointment 1 Application(s) Topical every 6 hours PRN Sore Nipples  magnesium hydroxide Suspension 30 milliLiter(s) Oral two times a day PRN Constipation  simethicone 80 milliGRAM(s) Chew every 4 hours PRN Gas      OBJECTIVE:    Sedation:        	[X] Alert	 [ ] Drowsy	[ ] Arousable      [ ] Asleep       [ ] Unresponsive    Side Effects:	[ ] None 	[ ] Nausea	[ ] Vomiting         [X ] Pruritus  		[ ] Weakness            [ ] Numbness	          [ ] Other:    Vital Signs Last 24 Hrs  T(C): 36.9 (23 Oct 2023 18:45), Max: 37.1 (23 Oct 2023 14:06)  T(F): 98.4 (23 Oct 2023 18:45), Max: 98.8 (23 Oct 2023 14:06)  HR: 96 (24 Oct 2023 05:00) (46 - 105)  BP: 95/55 (23 Oct 2023 21:45) (95/55 - 129/58)  BP(mean): 74 (23 Oct 2023 20:00) (73 - 88)  RR: 20 (24 Oct 2023 05:00) (16 - 36)  SpO2: 98% (24 Oct 2023 05:00) (97% - 100%)    Parameters below as of 24 Oct 2023 05:00  Patient On (Oxygen Delivery Method): room air        ASSESSMENT/ PLAN  [X] Patient to be transitioned to prn analgesics after 24 hours  [X] Pain management per primary service, pain service to sign off   [X]Documentation and Verification of current medications Day 1 of Anesthesia Pain Management Service    SUBJECTIVE: Doing ok  Pain Scale Score:          [X] Refer to charted pain scores    THERAPY:    s/p    100 mcg PF morphine on 10\23\2023      MEDICATIONS  (STANDING):  acetaminophen     Tablet .. 975 milliGRAM(s) Oral <User Schedule>  ceFAZolin   IVPB 2000 milliGRAM(s) IV Intermittent every 8 hours  diphtheria/tetanus/pertussis (acellular) Vaccine (Adacel) 0.5 milliLiter(s) IntraMuscular once  heparin   Injectable 5000 Unit(s) SubCutaneous every 12 hours  ketorolac   Injectable 30 milliGRAM(s) IV Push every 6 hours  lactated ringers. 1000 milliLiter(s) (125 mL/Hr) IV Continuous <Continuous>  morphine PF Spinal 0.1 milliGRAM(s) IntraThecal. once  oxytocin Infusion 333.333 milliUNIT(s)/Min (1000 mL/Hr) IV Continuous <Continuous>    MEDICATIONS  (PRN):  diphenhydrAMINE 25 milliGRAM(s) Oral every 6 hours PRN Pruritus  lanolin Ointment 1 Application(s) Topical every 6 hours PRN Sore Nipples  magnesium hydroxide Suspension 30 milliLiter(s) Oral two times a day PRN Constipation  simethicone 80 milliGRAM(s) Chew every 4 hours PRN Gas      OBJECTIVE:    Sedation:        	[X] Alert	 [ ] Drowsy	[ ] Arousable      [ ] Asleep       [ ] Unresponsive    Side Effects:	[ ] None 	[ ] Nausea	[ ] Vomiting         [X ] Pruritus  		[ ] Weakness            [ ] Numbness	          [ ] Other:    Vital Signs Last 24 Hrs  T(C): 36.9 (23 Oct 2023 18:45), Max: 37.1 (23 Oct 2023 14:06)  T(F): 98.4 (23 Oct 2023 18:45), Max: 98.8 (23 Oct 2023 14:06)  HR: 96 (24 Oct 2023 05:00) (46 - 105)  BP: 95/55 (23 Oct 2023 21:45) (95/55 - 129/58)  BP(mean): 74 (23 Oct 2023 20:00) (73 - 88)  RR: 20 (24 Oct 2023 05:00) (16 - 36)  SpO2: 98% (24 Oct 2023 05:00) (97% - 100%)    Parameters below as of 24 Oct 2023 05:00  Patient On (Oxygen Delivery Method): room air        ASSESSMENT/ PLAN  [X] Patient to be transitioned to prn analgesics after 24 hours  [X] Pain management per primary service, pain service to sign off   [X]Documentation and Verification of current medications    Day 1 of Anesthesia Pain Management Service    SUBJECTIVE:  Pain Scale Score:          [X] Refer to charted pain scores    THERAPY: Received PF spinal morphine as above    OBJECTIVE:    Sedation:        	[X] Alert	[ ] Drowsy	[ ] Arousable      [ ] Asleep       [ ] Unresponsive    Side Effects:	[X] None	[ ] Nausea	[ ] Vomiting         [ ] Pruritus  		[ ] Weakness            [ ] Numbness	          [ ] Other:    ASSESSMENT/ PLAN  [X] Patient transitioned to prn analgesics  [X] Pain management per primary service, pain service to sign off   [X]Documentation and Verification of current medications

## 2023-10-24 NOTE — LACTATION INITIAL EVALUATION - INTERVENTION OUTCOME
discussed pumping if infants receive formula supplement, mom states this is her 6th and 7th children and isn't interested at this time/verbalizes understanding/demonstrates understanding of teaching

## 2023-10-24 NOTE — PROGRESS NOTE ADULT - SUBJECTIVE AND OBJECTIVE BOX
Postpartum Note-  Section POD#1    Allergies  sulfa drugs (Hives)    RPR Negative  Blood Type  A  --  Positive  Rubella immune    Patient w/o complaints, pain is controlled.  Pt is OOB, tolerating PO, passing flatus. Lochia WNL.  reports dizziness when standing.    O:  Vital Signs Last 24 Hrs  T(C): 36.9 (23 Oct 2023 18:45), Max: 37.1 (23 Oct 2023 14:06)  T(F): 98.4 (23 Oct 2023 18:45), Max: 98.8 (23 Oct 2023 14:06)  HR: 96 (24 Oct 2023 05:00) (46 - 105)  BP: 95/55 (23 Oct 2023 21:45) (95/55 - 129/58)  BP(mean): 74 (23 Oct 2023 20:00) (73 - 88)  RR: 20 (24 Oct 2023 05:00) (16 - 36)  SpO2: 98% (24 Oct 2023 05:00) (97% - 100%)    Parameters below as of 24 Oct 2023 05:00  Patient On (Oxygen Delivery Method): room air      I&O's Summary    23 Oct 2023 07:01  -  24 Oct 2023 07:00  --------------------------------------------------------  IN: 1950 mL / OUT: 2284 mL / NET: -334 mL        Gen: NAD  Heart: S1S2 RRR  Lungs: CTA b/l  Abdomen: Soft, nontender, non-distended, fundus firm. dec BS  Incision: Clean, dry, and intact.  Negative erythema/edema/ecchymosis   Sub Q  Lochia WNL  Ext: PAS in place. Negative Homans B/L    LABS: pending    PAST MEDICAL & SURGICAL HISTORY:  Hypothyroid       (normal spontaneous vaginal delivery)      Miscarriage      S/P         Current Issues: dizziness- f/u AM labs

## 2023-10-25 RX ORDER — POLYETHYLENE GLYCOL 3350 17 G/17G
17 POWDER, FOR SOLUTION ORAL ONCE
Refills: 0 | Status: DISCONTINUED | OUTPATIENT
Start: 2023-10-25 | End: 2023-10-26

## 2023-10-25 RX ADMIN — OXYCODONE HYDROCHLORIDE 5 MILLIGRAM(S): 5 TABLET ORAL at 18:30

## 2023-10-25 RX ADMIN — OXYCODONE HYDROCHLORIDE 5 MILLIGRAM(S): 5 TABLET ORAL at 17:52

## 2023-10-25 RX ADMIN — Medication 975 MILLIGRAM(S): at 16:15

## 2023-10-25 RX ADMIN — Medication 600 MILLIGRAM(S): at 05:53

## 2023-10-25 RX ADMIN — Medication 975 MILLIGRAM(S): at 15:48

## 2023-10-25 RX ADMIN — SIMETHICONE 80 MILLIGRAM(S): 80 TABLET, CHEWABLE ORAL at 23:59

## 2023-10-25 RX ADMIN — HEPARIN SODIUM 5000 UNIT(S): 5000 INJECTION INTRAVENOUS; SUBCUTANEOUS at 03:20

## 2023-10-25 RX ADMIN — Medication 600 MILLIGRAM(S): at 12:25

## 2023-10-25 RX ADMIN — Medication 975 MILLIGRAM(S): at 21:33

## 2023-10-25 RX ADMIN — SIMETHICONE 80 MILLIGRAM(S): 80 TABLET, CHEWABLE ORAL at 17:54

## 2023-10-25 RX ADMIN — Medication 600 MILLIGRAM(S): at 13:00

## 2023-10-25 RX ADMIN — Medication 975 MILLIGRAM(S): at 04:20

## 2023-10-25 RX ADMIN — Medication 600 MILLIGRAM(S): at 23:56

## 2023-10-25 RX ADMIN — Medication 975 MILLIGRAM(S): at 03:20

## 2023-10-25 RX ADMIN — OXYCODONE HYDROCHLORIDE 5 MILLIGRAM(S): 5 TABLET ORAL at 10:10

## 2023-10-25 RX ADMIN — OXYCODONE HYDROCHLORIDE 5 MILLIGRAM(S): 5 TABLET ORAL at 12:24

## 2023-10-25 RX ADMIN — OXYCODONE HYDROCHLORIDE 5 MILLIGRAM(S): 5 TABLET ORAL at 13:00

## 2023-10-25 RX ADMIN — Medication 600 MILLIGRAM(S): at 06:53

## 2023-10-25 RX ADMIN — Medication 600 MILLIGRAM(S): at 17:58

## 2023-10-25 RX ADMIN — SIMETHICONE 80 MILLIGRAM(S): 80 TABLET, CHEWABLE ORAL at 03:20

## 2023-10-25 RX ADMIN — Medication 600 MILLIGRAM(S): at 00:50

## 2023-10-25 RX ADMIN — Medication 600 MILLIGRAM(S): at 18:30

## 2023-10-25 RX ADMIN — SIMETHICONE 80 MILLIGRAM(S): 80 TABLET, CHEWABLE ORAL at 09:37

## 2023-10-25 RX ADMIN — HEPARIN SODIUM 5000 UNIT(S): 5000 INJECTION INTRAVENOUS; SUBCUTANEOUS at 17:54

## 2023-10-25 RX ADMIN — Medication 975 MILLIGRAM(S): at 10:10

## 2023-10-25 RX ADMIN — OXYCODONE HYDROCHLORIDE 5 MILLIGRAM(S): 5 TABLET ORAL at 09:36

## 2023-10-25 RX ADMIN — Medication 975 MILLIGRAM(S): at 09:36

## 2023-10-25 NOTE — PROGRESS NOTE ADULT - SUBJECTIVE AND OBJECTIVE BOX
Postpartum Note-  Section POD#2    Prenatal Labs  Blood type: A Positive  Rubella IgG: Immune             RPR: Negative    S: Patient w/o complaints, pain is controlled.  Pt is OOB, tolerating PO, passing flatus, and voiding. Lochia WNL.     O:  Vital Signs Last 24 Hrs  T(C): 36.5 (25 Oct 2023 05:00), Max: 36.9 (24 Oct 2023 09:26)  T(F): 97.7 (25 Oct 2023 05:00), Max: 98.4 (24 Oct 2023 09:26)  HR: 51 (25 Oct 2023 05:00) (51 - 66)  BP: 123/72 (25 Oct 2023 05:00) (92/60 - 123/72)  RR: 18 (25 Oct 2023 05:00) (18 - 18)  SpO2: 97% (25 Oct 2023 05:00) (97% - 99%)    Parameters below as of 25 Oct 2023 05:00  Patient On (Oxygen Delivery Method): room air    General: NAD  Abdomen: Soft, nontender/appropriately tender, non distended, fundus firm.  Incision: Clean/dry/ intact   Lochia WNL  Ext: Neg edema, Neg calf tenderness    LABS:               10.4   10.98 )-----------( 201      ( 10-24 @ 07:03 )             30.4

## 2023-10-26 ENCOUNTER — TRANSCRIPTION ENCOUNTER (OUTPATIENT)
Age: 38
End: 2023-10-26

## 2023-10-26 VITALS — HEART RATE: 57 BPM

## 2023-10-26 PROCEDURE — 88307 TISSUE EXAM BY PATHOLOGIST: CPT

## 2023-10-26 PROCEDURE — 86901 BLOOD TYPING SEROLOGIC RH(D): CPT

## 2023-10-26 PROCEDURE — 86900 BLOOD TYPING SEROLOGIC ABO: CPT

## 2023-10-26 PROCEDURE — 59025 FETAL NON-STRESS TEST: CPT

## 2023-10-26 PROCEDURE — 86850 RBC ANTIBODY SCREEN: CPT

## 2023-10-26 PROCEDURE — 59050 FETAL MONITOR W/REPORT: CPT

## 2023-10-26 PROCEDURE — 86780 TREPONEMA PALLIDUM: CPT

## 2023-10-26 PROCEDURE — 85025 COMPLETE CBC W/AUTO DIFF WBC: CPT

## 2023-10-26 RX ORDER — ACETAMINOPHEN 500 MG
3 TABLET ORAL
Qty: 0 | Refills: 0 | DISCHARGE
Start: 2023-10-26

## 2023-10-26 RX ORDER — ASPIRIN/CALCIUM CARB/MAGNESIUM 324 MG
1.5 TABLET ORAL
Refills: 0 | DISCHARGE

## 2023-10-26 RX ORDER — IBUPROFEN 200 MG
1 TABLET ORAL
Qty: 0 | Refills: 0 | DISCHARGE
Start: 2023-10-26

## 2023-10-26 RX ADMIN — HEPARIN SODIUM 5000 UNIT(S): 5000 INJECTION INTRAVENOUS; SUBCUTANEOUS at 05:46

## 2023-10-26 RX ADMIN — SIMETHICONE 80 MILLIGRAM(S): 80 TABLET, CHEWABLE ORAL at 09:18

## 2023-10-26 RX ADMIN — Medication 975 MILLIGRAM(S): at 16:25

## 2023-10-26 RX ADMIN — SIMETHICONE 80 MILLIGRAM(S): 80 TABLET, CHEWABLE ORAL at 15:26

## 2023-10-26 RX ADMIN — Medication 975 MILLIGRAM(S): at 15:26

## 2023-10-26 RX ADMIN — Medication 975 MILLIGRAM(S): at 03:04

## 2023-10-26 RX ADMIN — Medication 600 MILLIGRAM(S): at 13:10

## 2023-10-26 RX ADMIN — Medication 600 MILLIGRAM(S): at 12:10

## 2023-10-26 RX ADMIN — Medication 975 MILLIGRAM(S): at 09:18

## 2023-10-26 RX ADMIN — Medication 975 MILLIGRAM(S): at 10:15

## 2023-10-26 RX ADMIN — Medication 600 MILLIGRAM(S): at 05:46

## 2023-10-26 RX ADMIN — MAGNESIUM HYDROXIDE 30 MILLILITER(S): 400 TABLET, CHEWABLE ORAL at 03:08

## 2023-10-26 NOTE — PROGRESS NOTE ADULT - SUBJECTIVE AND OBJECTIVE BOX
Postpartum Note-  Section POD#3    Allergies    sulfa drugs (Hives)    Intolerances        Rubella IgG:   Immune                   RPR:             Negative              Blood Type:  A+    S:Patient is a  38y G 7   P 7   POD#3 S/P C/Sec  Subjective: Patient w/o complaints, pain is controlled.  Pt is OOB, tolerating PO, passing flatus. Lochia WNL.   Feeding: Breastfeeding    O:  Vital Signs Last 24 Hrs  T(C): 36.3 (26 Oct 2023 05:05), Max: 36.8 (25 Oct 2023 20:45)  T(F): 97.4 (26 Oct 2023 05:05), Max: 98.3 (25 Oct 2023 20:45)  HR: 57 (26 Oct 2023 05:30) (54 - 63)  BP: 103/66 (26 Oct 2023 05:05) (99/64 - 109/69)  BP(mean): --  RR: 18 (26 Oct 2023 05:05) (18 - 18)  SpO2: 97% (26 Oct 2023 05:05) (95% - 97%)    Parameters below as of 26 Oct 2023 05:05  Patient On (Oxygen Delivery Method): room air         Gen: NAD  CV: rrr s1s2, CTABL  Abdomen: Soft, nontender, non-distended, fundus firm.  Bowel Sounds x 4 quadrants  Incision: Clean, dry, and intact.  Negative erythema/edema/ecchymosis   Sub Q  Lochia WNL  Ext:  Neg Edema, Neg Calf tenderness. Pedal pulses palpated B/L    LABS:        A/P:  38y  POD # 3 S/P  repeat  section with di/di twins, doing well    PMHx:    x4; mis x1 ( no d/c)  c/s last baby due to non reassuring tracing  current twin pregnancy    Current Issues: none    Increase OOB  Regular diet  AM CBC  PO Pain Protocol  Routine Postop/Postpartum care  Discharge Planning

## 2023-10-26 NOTE — DISCHARGE NOTE OB - CARE PROVIDER_API CALL
Celina Hitchcock  Obstetrics and Gynecology  [address] [facility] [address]  [place] [address]  Phone: [phone]  Fax: [phone]  Follow Up Time:

## 2023-10-26 NOTE — PROGRESS NOTE ADULT - PROBLEM SELECTOR PLAN 1
Increase OOB  Regular diet  AM CBC  PO Pain Protocol  Routine Postop/Postpartum care  Discharge Planning
Increase OOB  D/C IVF  PO Pain Protocol  Continue Regular Diet  Continue Routine Postop/Postpartum Care
Increase OOB  PO pain protocol  Due to void  Regular diet  AM CBC  Continue routine prenatal care

## 2023-10-26 NOTE — DISCHARGE NOTE OB - NS MD DC FALL RISK RISK
For information on Fall & Injury Prevention, visit: [email or web address] OR  [email or web address] OR  https://www.cdc.gov/steadi/patient.html

## 2023-10-26 NOTE — DISCHARGE NOTE OB - PATIENT PORTAL LINK FT
You can access the FollowMyHealth Patient Portal offered by [facility] by registering at the following website: [email or web address]. By joining [facility]’s FollowMyHealth portal, you will also be able to view your health information using other applications (apps) compatible with our system.

## 2023-10-26 NOTE — DISCHARGE NOTE OB - MEDICATION SUMMARY - MEDICATIONS TO TAKE
I will START or STAY ON the medications listed below when I get home from the hospital:    ibuprofen 600 mg oral tablet  -- 1 tab(s) by mouth every 6 hours  -- Indication: For Cramping    acetaminophen 325 mg oral tablet  -- 3 tab(s) by mouth every 6 hours as needed for  mild pain  -- Indication: For Mild pain

## 2023-10-26 NOTE — PROGRESS NOTE ADULT - ASSESSMENT
A/P:  39 yo POD #2 s/p rLTCS. Patient is doing well. 
38y  POD # 3 S/P  repeat  section with di/di twins, doing well
A/P:  38y  POD # 1 S/P repeat   section, doing well

## 2023-11-02 ENCOUNTER — APPOINTMENT (OUTPATIENT)
Dept: ULTRASOUND IMAGING | Facility: CLINIC | Age: 38
End: 2023-11-02
Payer: COMMERCIAL

## 2023-11-02 PROBLEM — O03.9 COMPLETE OR UNSPECIFIED SPONTANEOUS ABORTION WITHOUT COMPLICATION: Chronic | Status: ACTIVE | Noted: 2023-10-23

## 2023-11-02 PROCEDURE — 76830 TRANSVAGINAL US NON-OB: CPT

## 2023-11-02 PROCEDURE — 76856 US EXAM PELVIC COMPLETE: CPT

## 2023-11-02 PROCEDURE — 76700 US EXAM ABDOM COMPLETE: CPT

## 2023-11-03 NOTE — OB PST NOTE - FALL HARM RISK - TYPE OF ASSESSMENT
"PREOPERATIVE, MULTIDISCIPLINARY, MEDICALLY SUPERVISED, REDUCED CALORIE DIET, BEHAVIOR MODIFICATION AND EXERCISE PROGRAM    S: The patient is practicing pre-op behaviors.  24 hour food recall:  B: Premier protein shake; s; apple; L: 1/2c. lfat cottage cheese and turkey sandwich on white bread with lite mccann and D: cup pasta with turkey meatballs and 1/c. red sauce    O:     Vitals:    11/03/23 0841   Weight: (!) 153 kg (338 lb 3.2 oz)    Ht:   1.651 m (5' 5\")     BMI: Body mass index is 56.28 kg/m².    Goal: 5% body weight loss over the course of program    Dietary recommendation:   1. Continue to drink at least 64 ounces of water and calorie free, non-carbonated, and decaffeinated beverages  2. Practice the 30-30-30 rule by drinking between meals.  3. Structure your meal plan - have 3 meals and 1 snack daily.  4. Have balanced meals that always contain a good source of protein.  5. Increase intake of non-starchy vegetables.  Have 5 servings fruits and vegetables daily.   6. Continue to take a multivitamin daily.  7. Increase physical activity by 10-15 minutes to an end goal of 60 minutes 5 x per week.    Group Topic: Eating Triggers and Controlling Environment    Behavioral recommendation: Pt will be able to identify eating triggers and how to avoid them.    A/P: Pt appears to be able to recognize eating triggers and how to avoid eating when not hungry and/or not eating when it is not time for a meal or snack. Pt will continue to practice being mindful of healthy eating habits.    Exercise: 3x/wk for 25 min. Cardio and resistance and swimming once weekly 30-45 min.    The patient is scheduled for surgery on 12/13/23 and will take the new patient virtual education class on 11/15/23 for review.    Martha Smith RD, LD  "
Admission

## 2023-11-11 LAB
SURGICAL PATHOLOGY STUDY: SIGNIFICANT CHANGE UP
SURGICAL PATHOLOGY STUDY: SIGNIFICANT CHANGE UP
